# Patient Record
Sex: FEMALE | Race: BLACK OR AFRICAN AMERICAN | NOT HISPANIC OR LATINO | Employment: FULL TIME | URBAN - METROPOLITAN AREA
[De-identification: names, ages, dates, MRNs, and addresses within clinical notes are randomized per-mention and may not be internally consistent; named-entity substitution may affect disease eponyms.]

---

## 2016-07-21 LAB — PAP-ABSTRACT: NORMAL

## 2019-03-18 PROBLEM — D50.0 IRON DEFICIENCY ANEMIA DUE TO CHRONIC BLOOD LOSS: Status: ACTIVE | Noted: 2019-03-18

## 2019-03-19 ENCOUNTER — TELEPHONE (OUTPATIENT)
Dept: FAMILY MEDICINE | Facility: CLINIC | Age: 39
End: 2019-03-19

## 2019-03-19 ENCOUNTER — OFFICE VISIT (OUTPATIENT)
Dept: FAMILY MEDICINE | Facility: CLINIC | Age: 39
End: 2019-03-19
Payer: COMMERCIAL

## 2019-03-19 VITALS
DIASTOLIC BLOOD PRESSURE: 98 MMHG | HEART RATE: 112 BPM | TEMPERATURE: 98 F | SYSTOLIC BLOOD PRESSURE: 160 MMHG | BODY MASS INDEX: 33.59 KG/M2 | OXYGEN SATURATION: 100 % | WEIGHT: 214 LBS | HEIGHT: 67 IN

## 2019-03-19 DIAGNOSIS — Z00.00 ROUTINE HISTORY AND PHYSICAL EXAMINATION OF ADULT: Primary | ICD-10-CM

## 2019-03-19 DIAGNOSIS — N92.0 MENORRHAGIA WITH REGULAR CYCLE: ICD-10-CM

## 2019-03-19 DIAGNOSIS — D50.0 IRON DEFICIENCY ANEMIA DUE TO CHRONIC BLOOD LOSS: ICD-10-CM

## 2019-03-19 DIAGNOSIS — Z11.4 SCREENING FOR HUMAN IMMUNODEFICIENCY VIRUS WITHOUT PRESENCE OF RISK FACTORS: ICD-10-CM

## 2019-03-19 DIAGNOSIS — F41.9 ANXIETY: ICD-10-CM

## 2019-03-19 DIAGNOSIS — Z13.1 SCREENING FOR DIABETES MELLITUS: ICD-10-CM

## 2019-03-19 DIAGNOSIS — R03.0 ELEVATED BP WITHOUT DIAGNOSIS OF HYPERTENSION: ICD-10-CM

## 2019-03-19 DIAGNOSIS — Z13.21 ENCOUNTER FOR VITAMIN DEFICIENCY SCREENING: ICD-10-CM

## 2019-03-19 DIAGNOSIS — Z00.00 PREVENTATIVE HEALTH CARE: ICD-10-CM

## 2019-03-19 DIAGNOSIS — F10.20 ALCOHOL USE DISORDER, MODERATE, IN CONTROLLED ENVIRONMENT (H): ICD-10-CM

## 2019-03-19 DIAGNOSIS — J45.21 MILD INTERMITTENT ASTHMA WITH ACUTE EXACERBATION: ICD-10-CM

## 2019-03-19 DIAGNOSIS — Z12.4 SCREENING FOR MALIGNANT NEOPLASM OF CERVIX: ICD-10-CM

## 2019-03-19 DIAGNOSIS — R76.8 ANTI-TPO ANTIBODIES PRESENT: ICD-10-CM

## 2019-03-19 DIAGNOSIS — N39.3 FEMALE STRESS INCONTINENCE: ICD-10-CM

## 2019-03-19 DIAGNOSIS — Z13.220 LIPID SCREENING: ICD-10-CM

## 2019-03-19 LAB
ERYTHROCYTE [DISTWIDTH] IN BLOOD BY AUTOMATED COUNT: 14.7 % (ref 10–15)
HCT VFR BLD AUTO: 37 % (ref 35–47)
HGB BLD-MCNC: 12.7 G/DL (ref 11.7–15.7)
MCH RBC QN AUTO: 29.1 PG (ref 26.5–33)
MCHC RBC AUTO-ENTMCNC: 34.3 G/DL (ref 31.5–36.5)
MCV RBC AUTO: 85 FL (ref 78–100)
PLATELET # BLD AUTO: 221 10E9/L (ref 150–450)
RBC # BLD AUTO: 4.36 10E12/L (ref 3.8–5.2)
T3FREE SERPL-MCNC: 2.6 PG/ML (ref 2.3–4.2)
VIT B12 SERPL-MCNC: 1040 PG/ML (ref 193–986)
WBC # BLD AUTO: 3.3 10E9/L (ref 4–11)

## 2019-03-19 PROCEDURE — 82306 VITAMIN D 25 HYDROXY: CPT | Performed by: PHYSICIAN ASSISTANT

## 2019-03-19 PROCEDURE — 80053 COMPREHEN METABOLIC PANEL: CPT | Performed by: PHYSICIAN ASSISTANT

## 2019-03-19 PROCEDURE — 80061 LIPID PANEL: CPT | Performed by: PHYSICIAN ASSISTANT

## 2019-03-19 PROCEDURE — G0145 SCR C/V CYTO,THINLAYER,RESCR: HCPCS | Performed by: PHYSICIAN ASSISTANT

## 2019-03-19 PROCEDURE — 82728 ASSAY OF FERRITIN: CPT | Performed by: PHYSICIAN ASSISTANT

## 2019-03-19 PROCEDURE — 87624 HPV HI-RISK TYP POOLED RSLT: CPT | Performed by: PHYSICIAN ASSISTANT

## 2019-03-19 PROCEDURE — 99214 OFFICE O/P EST MOD 30 MIN: CPT | Mod: 25 | Performed by: PHYSICIAN ASSISTANT

## 2019-03-19 PROCEDURE — 82607 VITAMIN B-12: CPT | Performed by: PHYSICIAN ASSISTANT

## 2019-03-19 PROCEDURE — 82977 ASSAY OF GGT: CPT | Performed by: PHYSICIAN ASSISTANT

## 2019-03-19 PROCEDURE — 99385 PREV VISIT NEW AGE 18-39: CPT | Performed by: PHYSICIAN ASSISTANT

## 2019-03-19 PROCEDURE — 36415 COLL VENOUS BLD VENIPUNCTURE: CPT | Performed by: PHYSICIAN ASSISTANT

## 2019-03-19 PROCEDURE — 84439 ASSAY OF FREE THYROXINE: CPT | Performed by: PHYSICIAN ASSISTANT

## 2019-03-19 PROCEDURE — 84443 ASSAY THYROID STIM HORMONE: CPT | Performed by: PHYSICIAN ASSISTANT

## 2019-03-19 PROCEDURE — 84481 FREE ASSAY (FT-3): CPT | Performed by: PHYSICIAN ASSISTANT

## 2019-03-19 PROCEDURE — 87389 HIV-1 AG W/HIV-1&-2 AB AG IA: CPT | Performed by: PHYSICIAN ASSISTANT

## 2019-03-19 PROCEDURE — 85027 COMPLETE CBC AUTOMATED: CPT | Performed by: PHYSICIAN ASSISTANT

## 2019-03-19 RX ORDER — LANOLIN ALCOHOL/MO/W.PET/CERES
CREAM (GRAM) TOPICAL DAILY
COMMUNITY
Start: 2019-03-19

## 2019-03-19 RX ORDER — LEVALBUTEROL TARTRATE 45 UG/1
2 AEROSOL, METERED ORAL EVERY 4 HOURS PRN
Qty: 15 G | Refills: 1 | Status: SHIPPED | OUTPATIENT
Start: 2019-03-19 | End: 2020-04-03

## 2019-03-19 RX ORDER — FLUTICASONE PROPIONATE AND SALMETEROL XINAFOATE 115; 21 UG/1; UG/1
2 AEROSOL, METERED RESPIRATORY (INHALATION)
COMMUNITY
Start: 2018-11-15 | End: 2019-03-19

## 2019-03-19 RX ORDER — ASCORBIC ACID 500 MG
500 TABLET ORAL DAILY
COMMUNITY
Start: 2019-03-19 | End: 2020-03-25

## 2019-03-19 RX ORDER — FERROUS SULFATE 325(65) MG
325 TABLET, DELAYED RELEASE (ENTERIC COATED) ORAL EVERY 24 HOURS
COMMUNITY
Start: 2015-03-19

## 2019-03-19 RX ORDER — LEVALBUTEROL TARTRATE 45 UG/1
AEROSOL, METERED ORAL
COMMUNITY
Start: 2018-11-16 | End: 2019-03-19

## 2019-03-19 RX ORDER — FLUTICASONE PROPIONATE 50 MCG
2 SPRAY, SUSPENSION (ML) NASAL EVERY 24 HOURS
COMMUNITY
Start: 2016-04-15

## 2019-03-19 RX ORDER — LEVONORGESTREL/ETHIN.ESTRADIOL 0.1-0.02MG
1 TABLET ORAL DAILY
Qty: 84 TABLET | Refills: 3 | Status: CANCELLED | OUTPATIENT
Start: 2019-03-19

## 2019-03-19 RX ORDER — OMEGA-3 FATTY ACIDS/FISH OIL 300-1000MG
2 CAPSULE ORAL
COMMUNITY
End: 2024-08-23

## 2019-03-19 SDOH — HEALTH STABILITY: MENTAL HEALTH: HOW MANY STANDARD DRINKS CONTAINING ALCOHOL DO YOU HAVE ON A TYPICAL DAY?: 3 OR 4

## 2019-03-19 SDOH — HEALTH STABILITY: MENTAL HEALTH: HOW OFTEN DO YOU HAVE A DRINK CONTAINING ALCOHOL?: 4 OR MORE TIMES A WEEK

## 2019-03-19 SDOH — HEALTH STABILITY: MENTAL HEALTH: HOW OFTEN DO YOU HAVE 6 OR MORE DRINKS ON ONE OCCASION?: WEEKLY

## 2019-03-19 ASSESSMENT — MIFFLIN-ST. JEOR: SCORE: 1682.29

## 2019-03-19 NOTE — TELEPHONE ENCOUNTER
Attempt # [phone] to [phone]    Left non-detailed VM for patient to call back and speak with any triage nurse.    Carli Richmond, JERI, RN, PHN  [facility]

## 2019-03-19 NOTE — TELEPHONE ENCOUNTER
Reason for Call:  Other returning call    Detailed comments: The patient is returning a call left for her by the nurse. She would like a call back.    Phone Number Patient can be reached at: Cell number on file:    Telephone Information:   Mobile [phone]     Best Time: Anytime    Can we leave a detailed message on this number? YES    Call taken on 3/19/2019 at 3:28 PM by Cleo Mark

## 2019-03-19 NOTE — TELEPHONE ENCOUNTER
Please call pt to clarify what she has tried and what side effects (if any) so we can try to get a PA approved.  If no side effects/intolerances will have to send in albuterol.  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

## 2019-03-19 NOTE — TELEPHONE ENCOUNTER
Prior Authorization Retail Medication Request    Medication/Dose: Xopenex  ICD code (if different than what is on RX):  Mild intermittent asthma with acute exacerbation [J45.21]   Previously Tried and Failed:  unknown  Rationale:  unknown    Insurance Name:  [employer]  Insurance ID:  [id number]      -Cari Copeland,Certified Pharmacy Technician, CPhT,       [facility], Ph. [phone]
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
## 2019-03-19 NOTE — TELEPHONE ENCOUNTER
Patient said that she had heart palpitations on Albuterol that is why she was switched to Xopenex.      JERI Bonner, RN, [facility]) [phone]

## 2019-03-19 NOTE — LETTER
My Asthma Action Plan  Name: Alize Barrett   YOB: 1980  Date: 3/19/2019   My doctor: Lena Gaming PA-C   My clinic: [facility]   Good Control    I feel good    No cough or wheeze    Can work, sleep and play without asthma symptoms       Take your asthma control medicine every day.     1. If exercise triggers your asthma, take your rescue medication    15 minutes before exercise or sports, and    During exercise if you have asthma symptoms  2. Spacer to use with inhaler: If you have a spacer, make sure to use it with your inhaler             YELLOW ZONE Getting Worse  I have ANY of these:    I do not feel good    Cough or wheeze    Chest feels tight    Wake up at night   1. Keep taking your Green Zone medications  2. Start taking your rescue medicine:    every 20 minutes for up to 1 hour. Then every 4 hours for 24-48 hours.  3. If you stay in the Yellow Zone for more than 12-24 hours, contact your doctor.  4. If you do not return to the Green Zone in 12-24 hours or you get worse, start taking your oral steroid medicine if prescribed by your provider.           RED ZONE Medical Alert - Get Help  I have ANY of these:    I feel awful    Medicine is not helping    Breathing getting harder    Trouble walking or talking    Nose opens wide to breathe       1. Take your rescue medicine NOW  2. If your provider has prescribed an oral steroid medicine, start taking it NOW  3. Call your doctor NOW  4. If you are still in the Red Zone after 20 minutes and you have not reached your doctor:    Take your rescue medicine again and    Call 911 or go to the emergency room right away    See your regular doctor within 2 weeks of an Emergency Room or Urgent Care visit for follow-up treatment.          Annual Reminders:  Meet with Asthma Educator,  Flu Shot in the Fall, consider Pneumonia Vaccination for patients with asthma (aged 19 and older).    Pharmacy:  [address] [place] [address]                      Asthma Triggers  How To Control Things That Make Your Asthma Worse    Triggers are things that make your asthma worse.  Look at the list below to help you find your triggers and what you can do about them.  You can help prevent asthma flare-ups by staying away from your triggers.      Trigger                                                          What you can do   Cigarette Smoke  Tobacco smoke can make asthma worse. Do not allow smoking in your home, car or around you.  Be sure no one smokes at a child s day care or school.  If you smoke, ask your health care provider for ways to help you quit.  Ask family members to quit too.  Ask your health care provider for a referral to Quit Plan to help you quit smoking, or call 7-140-227-PLAN.     Colds, Flu, Bronchitis  These are common triggers of asthma. Wash your hands often.  Don t touch your eyes, nose or mouth.  Get a flu shot every year.     Dust Mites  These are tiny bugs that live in cloth or carpet. They are too small to see. Wash sheets and blankets in hot water every week.   Encase pillows and mattress in dust mite proof covers.  Avoid having carpet if you can. If you have carpet, vacuum weekly.   Use a dust mask and HEPA vacuum.   Pollen and Outdoor Mold  Some people are allergic to trees, grass, or weed pollen, or molds. Try to keep your windows closed.  Limit time out doors when pollen count is high.   Ask you health care provider about taking medicine during allergy season.     Animal Dander  Some people are allergic to skin flakes, urine or saliva from pets with fur or feathers. Keep pets with fur or feathers out of your home.    If you can t keep the pet outdoors, then keep the pet out of your bedroom.  Keep the bedroom door closed.  Keep pets off cloth furniture and away from stuffed toys.     Mice, Rats, and Cockroaches  Some people are allergic to the waste from these  pests.   Cover food and garbage.  Clean up spills and food crumbs.  Store grease in the refrigerator.   Keep food out of the bedroom.   Indoor Mold  This can be a trigger if your home has high moisture. Fix leaking faucets, pipes, or other sources of water.   Clean moldy surfaces.  Dehumidify basement if it is damp and smelly.   Smoke, Strong Odors, and Sprays  These can reduce air quality. Stay away from strong odors and sprays, such as perfume, powder, hair spray, paints, smoke incense, paint, cleaning products, candles and new carpet.   Exercise or Sports  Some people with asthma have this trigger. Be active!  Ask your doctor about taking medicine before sports or exercise to prevent symptoms.    Warm up for 5-10 minutes before and after sports or exercise.     Other Triggers of Asthma  Cold air:  Cover your nose and mouth with a scarf.  Sometimes laughing or crying can be a trigger.  Some medicines and food can trigger asthma.

## 2019-03-20 LAB
ALBUMIN SERPL-MCNC: 3.7 G/DL (ref 3.4–5)
ALP SERPL-CCNC: 37 U/L (ref 40–150)
ALT SERPL W P-5'-P-CCNC: 31 U/L (ref 0–50)
ANION GAP SERPL CALCULATED.3IONS-SCNC: 5 MMOL/L (ref 3–14)
AST SERPL W P-5'-P-CCNC: 22 U/L (ref 0–45)
BILIRUB SERPL-MCNC: 0.4 MG/DL (ref 0.2–1.3)
BUN SERPL-MCNC: 12 MG/DL (ref 7–30)
CALCIUM SERPL-MCNC: 8.6 MG/DL (ref 8.5–10.1)
CHLORIDE SERPL-SCNC: 109 MMOL/L (ref 94–109)
CHOLEST SERPL-MCNC: 167 MG/DL
CO2 SERPL-SCNC: 25 MMOL/L (ref 20–32)
CREAT SERPL-MCNC: 0.83 MG/DL (ref 0.52–1.04)
FERRITIN SERPL-MCNC: 35 NG/ML (ref 12–150)
GFR SERPL CREATININE-BSD FRML MDRD: 88 ML/MIN/{1.73_M2}
GGT SERPL-CCNC: 105 U/L (ref 0–40)
GLUCOSE SERPL-MCNC: 91 MG/DL (ref 70–99)
HDLC SERPL-MCNC: 60 MG/DL
LDLC SERPL CALC-MCNC: 72 MG/DL
NONHDLC SERPL-MCNC: 107 MG/DL
POTASSIUM SERPL-SCNC: 4.1 MMOL/L (ref 3.4–5.3)
PROT SERPL-MCNC: 7.5 G/DL (ref 6.8–8.8)
SODIUM SERPL-SCNC: 139 MMOL/L (ref 133–144)
T4 FREE SERPL-MCNC: 1.02 NG/DL (ref 0.76–1.46)
TRIGL SERPL-MCNC: 175 MG/DL
TSH SERPL DL<=0.005 MIU/L-ACNC: 1.98 MU/L (ref 0.4–4)

## 2019-03-20 ASSESSMENT — ASTHMA QUESTIONNAIRES: ACT_TOTALSCORE: 22

## 2019-03-20 NOTE — TELEPHONE ENCOUNTER
Prior Authorization Retail Medication Request    Medication/Dose: Xopenex  ICD code (if different than what is on RX):  Mild intermittent asthma with acute exacerbation [J45.21]   Previously Tried and Failed:  Palpitations with Albuterol   Rationale:  Side effects from out medication     Insurance Name:  [employer] [place]  Insurance ID:  [id number]      Pharmacy Information (if different than what is on RX)  Name:  [facility]   Phone:  [phone]    Sophie Aguirre MA

## 2019-03-21 LAB
COPATH REPORT: NORMAL
DEPRECATED CALCIDIOL+CALCIFEROL SERPL-MC: 49 UG/L (ref 20–75)
HIV 1+2 AB+HIV1 P24 AG SERPL QL IA: NONREACTIVE
PAP: NORMAL

## 2019-03-21 NOTE — RESULT ENCOUNTER NOTE
Alize  I have reviewed your recent labs. Here are the results:    -Normal red blood cell (hgb) levels, low-normal white blood cell count and normal platelet levels.  We don't have a comparison on file so we will recheck your white count at your follow up appointment.  -Triglycerides are elevated which can increase your heart disease risk, this is MOST likely from your alcohol intake.  A diet high in fat and simple carbohydrates, genetics and being overweight can contribute to this.  Your LDL(bad) cholesterol and HDL(good) cholesterol levels are normal.  ADVISE: exercising 150 minutes of aerobic exercise per week (30 minutes 5 days per week or 50 minutes 3 days per week are options), weight control, and omega-3 fatty acids (fish oil) 2146-9194 mg daily are helpful to improve this.  In 3 months, you should recheck your fasting cholesterol panel  -Liver and gallbladder tests are normal (ALT,AST, Alk phos, bilirubin), kidney function is normal (Cr, GFR), sodium is normal, potassium is normal, calcium is normal, glucose is normal.  -TSH (thyroid stimulating hormone) level, T4 and T3 (thyroid hormones) are normal which indicates normal thyroid function.  -HIV test is normal.  -Vitamin D level is normal and getting 1000 IU daily in your diet or supplements is recommended.   -Ferritin (iron) level is normal.  -GGT level is high which is a liver function test that is VERY sensitive to alcohol.  Continue to work on cessation/tapering and we will follow this up and make sure it is trending down.  For additional lab test information, labtestsonline.org is an excellent reference.     If you have any questions please do not hesitate to contact our office via phone ([phone]) or MyChart.    Lena Gaming, MS, PA-C  [facility]

## 2019-03-25 LAB
FINAL DIAGNOSIS: NORMAL
HPV HR 12 DNA CVX QL NAA+PROBE: NEGATIVE
HPV16 DNA SPEC QL NAA+PROBE: NEGATIVE
HPV18 DNA SPEC QL NAA+PROBE: NEGATIVE
SPECIMEN DESCRIPTION: NORMAL
SPECIMEN SOURCE CVX/VAG CYTO: NORMAL

## 2019-03-25 NOTE — TELEPHONE ENCOUNTER
Prior Authorization Approval    Authorization Effective Date: 3/25/2019  Authorization Expiration Date: 3/24/2020  Medication: Xopenex- APPROVED  Approved Dose/Quantity: 15G PER 17 DAYS  Reference #: [id number]   Insurance Company: [employer] - Phone [phone] Fax [phone]  Expected CoPay:       CoPay Card Available:      Foundation Assistance Needed:    Which Pharmacy is filling the prescription (Not needed for infusion/clinic administered): [facility] - [place], [place] - [address] [place] [address]  Pharmacy Notified: Yes  Patient Notified: Yes

## 2019-03-25 NOTE — TELEPHONE ENCOUNTER
PA Initiation    Medication: Xopenex- INITIATED  Insurance Company: [employer] - Phone [phone] Fax [phone]  Pharmacy Filling the Rx: [facility] - [place], [place] - [address] [place] [address]  Filling Pharmacy Phone: [phone]  Filling Pharmacy Fax:    Start Date: 3/25/2019

## 2019-04-30 ENCOUNTER — ALLIED HEALTH/NURSE VISIT (OUTPATIENT)
Dept: NURSING | Facility: CLINIC | Age: 39
End: 2019-04-30
Payer: COMMERCIAL

## 2019-04-30 DIAGNOSIS — Z23 ENCOUNTER FOR IMMUNIZATION: Primary | ICD-10-CM

## 2019-04-30 PROCEDURE — 90471 IMMUNIZATION ADMIN: CPT

## 2019-04-30 PROCEDURE — 90707 MMR VACCINE SC: CPT

## 2019-06-11 ENCOUNTER — TELEPHONE (OUTPATIENT)
Dept: ADDICTION MEDICINE | Facility: CLINIC | Age: 39
End: 2019-06-11

## 2019-06-11 ENCOUNTER — OFFICE VISIT (OUTPATIENT)
Dept: FAMILY MEDICINE | Facility: CLINIC | Age: 39
End: 2019-06-11
Payer: COMMERCIAL

## 2019-06-11 VITALS
SYSTOLIC BLOOD PRESSURE: 142 MMHG | OXYGEN SATURATION: 100 % | BODY MASS INDEX: 33.27 KG/M2 | DIASTOLIC BLOOD PRESSURE: 94 MMHG | TEMPERATURE: 98.2 F | WEIGHT: 214 LBS | HEART RATE: 97 BPM

## 2019-06-11 DIAGNOSIS — I10 ESSENTIAL HYPERTENSION: ICD-10-CM

## 2019-06-11 DIAGNOSIS — F10.20 ALCOHOL USE DISORDER, MODERATE, IN CONTROLLED ENVIRONMENT (H): Primary | ICD-10-CM

## 2019-06-11 PROCEDURE — 99214 OFFICE O/P EST MOD 30 MIN: CPT | Performed by: FAMILY MEDICINE

## 2019-06-11 RX ORDER — HYDROCHLOROTHIAZIDE 12.5 MG/1
12.5 TABLET ORAL DAILY
Qty: 30 TABLET | Refills: 0 | Status: SHIPPED | OUTPATIENT
Start: 2019-06-11 | End: 2019-07-10

## 2019-06-11 NOTE — TELEPHONE ENCOUNTER
Please review referral. Please route back to reception pool #[id number].    Thank you,    Arline Faria  [facility]

## 2019-06-11 NOTE — PROGRESS NOTES
Subjective     Alize Barrett is a 38 year old female who presents to clinic today for the following health issues:    HPI   Hypertension Follow-up  High blood pressures last 2 weeks    Do you check your blood pressure regularly outside of the clinic? Yes     Are you following a low salt diet? No    Are your blood pressures ever more than 140 on the top number (systolic) OR more   than 90 on the bottom number (diastolic), for example 140/90? Yes 168/104 is the highest  Amount of exercise or physical activity: None    Problems taking medications regularly: n/a    Medication side effects: n/a    Diet: regular (no restrictions)    Denies any headaches, lightheadedness, dizziness, vision changes, chest pain, palpitations, shortness of breath, dyspnea, numbness/tingling.      Alcohol Use Disorder: Alize is currently drinking 12 oz of hard alcohol daily. She attributes much of her drinking to the stress of her job. She works as a respiratory therapist in pediatrics.    Patient Active Problem List   Diagnosis     Anti-TPO antibodies present     Anxiety     Class 2 obesity without serious comorbidity with body mass index (BMI) of 35.0 to 35.9 in adult     Iron deficiency anemia due to chronic blood loss     Mild intermittent asthma with acute exacerbation     PMS (premenstrual syndrome)     Alcohol use disorder, moderate, in controlled environment (H)     Menorrhagia with regular cycle     Encounter for vitamin deficiency screening     Past Surgical History:   Procedure Laterality Date     LAPAROSCOPIC TUBAL LIGATION Bilateral        Social History     Tobacco Use     Smoking status: Former Smoker     Packs/day: 0.50     Years: 17.00     Pack years: 8.50     Types: Cigarettes     Last attempt to quit: 3/19/2012     Years since quittin.2     Smokeless tobacco: Never Used   Substance Use Topics     Alcohol use: Yes     Alcohol/week: 21.6 oz     Types: 36 Standard drinks or equivalent per week     Frequency: 4 or  more times a week     Drinks per session: 3 or 4     Binge frequency: Weekly     Family History   Problem Relation Age of Onset     Hypertension Mother      Kidney failure Father 32     Coronary Artery Disease Maternal Grandmother      Diabetes Maternal Grandmother      Cancer Maternal Grandmother         possibly gynecologist but unsure     Alcoholism Maternal Grandfather      Kidney Disease Paternal Grandmother      Colon Cancer No family hx of      Breast Cancer No family hx of              Reviewed and updated as needed this visit by Provider         Review of Systems   ROS COMP: Constitutional, HEENT, cardiovascular, pulmonary, gi and gu systems are negative, except as otherwise noted.      Objective    BP (!) 142/94   Pulse 97   Temp 98.2  F (36.8  C) (Oral)   Wt 97.1 kg (214 lb)   SpO2 100%   BMI 33.27 kg/m    Body mass index is 33.27 kg/m .  Physical Exam   GENERAL: healthy, alert and no distress  RESP: lungs clear to auscultation - no rales, rhonchi or wheezes  CV: regular rate and rhythm, normal S1 S2, no S3 or S4, no murmur, click or rub, no peripheral edema and peripheral pulses strong  MS: no gross musculoskeletal defects noted, no edema  PSYCH: mentation appears normal, affect normal/bright    Diagnostic Test Results:  none         Assessment & Plan     1. Alcohol use disorder, moderate, in controlled environment (H): referral placed to addiction clinic.  - ADDICTION MEDICINE REFERRAL    2. Essential hypertension: start hydrochlorothiazide 12.5 mg daily. Discussed increasing exercise, weight loss, low sodium diet, and cutting back on alcohol to help with blood pressure. Follow up in 2 weeks for BP recheck - titrate hydrochlorothiazide as needed.  - hydrochlorothiazide (HYDRODIURIL) 12.5 MG tablet; Take 1 tablet (12.5 mg) by mouth daily  Dispense: 30 tablet; Refill: 0  - RN HTN MGMT     BMI:   Estimated body mass index is 33.27 kg/m  as calculated from the following:    Height as of 3/19/19: 1.708  "m (5' 7.25\").    Weight as of this encounter: 97.1 kg (214 lb).   Weight management plan: Discussed healthy diet and exercise guidelines        Return in about 2 weeks (around 6/25/2019) for BP Recheck with RN.    Jasper Lomax, [facility]      "

## 2019-06-13 NOTE — TELEPHONE ENCOUNTER
Pt is scheduled to see Dr. Feliz on 6/28 @ 10:40 am.  Closing encounter, no further action needed.      Zan Crowe

## 2019-06-14 ENCOUNTER — TELEPHONE (OUTPATIENT)
Dept: FAMILY MEDICINE | Facility: CLINIC | Age: 39
End: 2019-06-14

## 2019-06-14 NOTE — TELEPHONE ENCOUNTER
I spoke to Nestor. Advised I don't have consent to communicate. Nestor says he understands and gave me her call number to call.     Cell is [phone]. I left non detailed message advising to call me back as I would like to discuss her concerns. Deanna Ocampo R.N.

## 2019-06-14 NOTE — LETTER
[facility]  [address] [facility] [address] [phone]  June 18, 2019    Alize Barrett  [address] [place] [id number]    Dear Alize,    Our office has been trying to reach you in regards to a question you had, but we have been unsuccessful in our attempts to reach you.  At this time, Dr. Lomax would like to see you in the office or do a phone appointment to address the symptoms you are having.  Please contact our office at your earliest convenience at [phone].    Thank you for choosing [facility]

## 2019-06-14 NOTE — TELEPHONE ENCOUNTER
Reason for Call:  Other prescription    Detailed comments: Since beginning the hydrochlorothiazide she has begun retaining fluid.  would like to know if she get also get a lasix prescribed to help with that.      Phone Number Patient can be reached at: Home number on file [phone] (home)    Best Time: anytime     Can we leave a detailed message on this number? YES    Call taken on 6/14/2019 at 1:54 PM by Yolanda Aguillon

## 2019-06-14 NOTE — TELEPHONE ENCOUNTER
[facility]. Left message to return call and I will try back later as well. Deanna Ocampo R.N.

## 2019-06-14 NOTE — TELEPHONE ENCOUNTER
"I returned call for 3rd time this afternoon and it went to voice mail. I left detailed message and informed Rosalie that the hydrochlorothiazide is a diuretic so it should be helping eliminate excess fluid. Informed her I wanted to gather more information on where her swelling is, does she have any SOB, other signs or symptoms and other triage questions. Since I am unable to assess or reach her despite calling back 3 times, I did advise if she has any color change of skin, warmth, or coolness to the touch, and SOB, numbness or tingling of extremities of new \"swelling\" where it was present or any other new or concerning symptoms she should report to ED. Advised can try calling back and discussing with FNA RN to help further triage and assess.     Will route to Dr. Lomax to advise. Deanna Ocampo R.N.    "

## 2019-06-14 NOTE — TELEPHONE ENCOUNTER
Reason for Call:  Other returning call    Detailed comments: Pt called this afternoon and is returning a nurse phone call from Dr. Lomax. Please give pt a call back when you can. Thank you.    Phone Number Patient can be reached at: Home number on file [phone] (home)    Best Time:     Can we leave a detailed message on this number? YES    Call taken on 6/14/2019 at 3:12 PM by Martina Garcia

## 2019-06-15 NOTE — TELEPHONE ENCOUNTER
I would not expect any lower extremity swelling to be caused by hydrochlorothiazide. We can most certainly try an alternative antihypertensive medication, however, I would first like to get more information about her symptoms. We could set up an eVisit or telephone visit if that is convenient for Alize.    Jasper Lomax DO  6/15/2019 8:41 AM

## 2019-06-17 NOTE — TELEPHONE ENCOUNTER
Call 4th attempt since Friday to reach Alize, no answer. Left a message I will have out  reach out to her to see if she would like to schedule a telephone or other visit to discuss concerns directly with Dr. Lomax. Deanna Ocampo R.N.

## 2019-06-28 ENCOUNTER — OFFICE VISIT (OUTPATIENT)
Dept: ADDICTION MEDICINE | Facility: CLINIC | Age: 39
End: 2019-06-28
Payer: COMMERCIAL

## 2019-06-28 VITALS
HEART RATE: 94 BPM | DIASTOLIC BLOOD PRESSURE: 60 MMHG | OXYGEN SATURATION: 100 % | TEMPERATURE: 99 F | BODY MASS INDEX: 33.43 KG/M2 | WEIGHT: 213 LBS | SYSTOLIC BLOOD PRESSURE: 110 MMHG | HEIGHT: 67 IN

## 2019-06-28 DIAGNOSIS — F41.9 ANXIETY: ICD-10-CM

## 2019-06-28 DIAGNOSIS — F10.20 ALCOHOL USE DISORDER, MODERATE, IN CONTROLLED ENVIRONMENT (H): Primary | ICD-10-CM

## 2019-06-28 DIAGNOSIS — Z79.899 HIGH RISK MEDICATION USE: ICD-10-CM

## 2019-06-28 LAB
AMPHETAMINES UR QL: NOT DETECTED NG/ML
BARBITURATES UR QL SCN: NOT DETECTED NG/ML
BENZODIAZ UR QL SCN: NOT DETECTED NG/ML
BUPRENORPHINE UR QL: NOT DETECTED NG/ML
CANNABINOIDS UR QL: NOT DETECTED NG/ML
COCAINE UR QL SCN: NOT DETECTED NG/ML
D-METHAMPHET UR QL: NOT DETECTED NG/ML
METHADONE UR QL SCN: NOT DETECTED NG/ML
OPIATES UR QL SCN: NOT DETECTED NG/ML
OXYCODONE UR QL SCN: NOT DETECTED NG/ML
PCP UR QL SCN: NOT DETECTED NG/ML
PROPOXYPH UR QL: NOT DETECTED NG/ML
TRICYCLICS UR QL SCN: NOT DETECTED NG/ML

## 2019-06-28 PROCEDURE — 80306 DRUG TEST PRSMV INSTRMNT: CPT | Performed by: PEDIATRICS

## 2019-06-28 PROCEDURE — 99204 OFFICE O/P NEW MOD 45 MIN: CPT | Performed by: PEDIATRICS

## 2019-06-28 RX ORDER — DIAZEPAM 5 MG
5 TABLET ORAL EVERY 8 HOURS PRN
Qty: 12 TABLET | Refills: 0 | Status: SHIPPED | OUTPATIENT
Start: 2019-06-28 | End: 2019-09-27

## 2019-06-28 RX ORDER — GABAPENTIN 300 MG/1
300 CAPSULE ORAL 3 TIMES DAILY
Qty: 30 CAPSULE | Refills: 0 | Status: SHIPPED | OUTPATIENT
Start: 2019-06-28 | End: 2020-01-30

## 2019-06-28 RX ORDER — NALTREXONE HYDROCHLORIDE 50 MG/1
50 TABLET, FILM COATED ORAL DAILY
Qty: 30 TABLET | Refills: 0 | Status: SHIPPED | OUTPATIENT
Start: 2019-06-28 | End: 2020-03-25 | Stop reason: SINTOL

## 2019-06-28 ASSESSMENT — MIFFLIN-ST. JEOR: SCORE: 1677.75

## 2019-06-28 NOTE — PATIENT INSTRUCTIONS
Gabapentin 300mg 3 x day starting now as needed for anxiety.     Naltrexone 25mg daily for 3 days then 50mg daily    Follow up 11:40  Alley July 3 th.  Dr. Feliz    AA meeting.     Counseled the patient on the importance of having a recovery program in addition to Medication assisted treatment.    Components of a recovery program include having some type of sober network, avoiding isolating, having willingness  to change, avoiding triggers and managing cravings.    Options for support include :  Alcoholics Anonymous   (www.aa.org)       AA Intergroup service office in [place]  (http://www.aastpaul.org/)   [phone]  ([place] [phone])  AA Intergroup service office in [facility]: (http://www.[employer].org/)  [phone].   Narcotics Anonymous (www.[place].org)    [phone]   [employer]   (www.[employer])  [facility] ([facility])  [facility] connects people seeking recovery to resources that help foster and sustain long-term recovery.  Whether you are seeking resources for treatment, transportation, housing, job training, education, health care or other pathways to recovery, [facility] is a great place to start.  (www.[employer].org)   [phone].     [place] and local community support groups, mental health counseling.       Strongly recommended abstaining from alcohol, benzodiazepines, THC, opioids and other drugs of abuse.

## 2019-06-28 NOTE — PROGRESS NOTES
SUBJECTIVE:                                                        Alize Barrett is a 38 year old female who presents for  initial visit for addiction consultation and management referred by Dr. Lomax.        [place] Board of Pharmacy Data Base Reviewed:    Yes; No Concerns Noted   6/28/2019      HPI:  Patient is concerned about her inability to quit drinking.    About two years ago in college was drinking for stress relief.  Has escalated to point that she is having severe anxiety if she tries to take  a few days without drinking.  More recently she starts every day promising herself not to drink but isn't able to abstain.    Currently average drinking 1.75 L q 3-4 days.  Drinking in evening.  On day off will start earlier.    Has been trying to cut back  for about four months.    This morning was experiencing significant anxiety.  Occasionally has hand tremor.  Denies vomiting.  Mild headache.  No hallucinations.  No history of seizures.  No history of complicated withdrawal.    Came in to see PCP. Due to BP increasing has been up to 160/99.     THC occasionally vape very rare.    Has been  prescribed  Xanax  (took 1/2 tab on way here).  Only filled once about 5 yr ago. Still has more than half rx left.       NICOTINE-previous               PAST PSYCHIATRIC HISTORY   long-standing history of anxiety and panic disorder.  Was treated for period of time with very low-dose Prozac but was never very comfortable taking it.  Feels that she is self-medicating with her drinking.    Patient Active Problem List    Diagnosis Date Noted     Alcohol use disorder, moderate, in controlled environment (H) 03/19/2019     Priority: Medium     Menorrhagia with regular cycle 03/19/2019     Priority: Medium     Encounter for vitamin deficiency screening 03/19/2019     Priority: Medium     Iron deficiency anemia due to chronic blood loss 03/18/2019     Priority: Medium     Anti-TPO antibodies present 11/17/2014      Priority: Medium     Overview:   Has normal TSH.  Recommend annual TSH cascade.       Class 2 obesity without serious comorbidity with body mass index (BMI) of 35.0 to 35.9 in adult 11/06/2014     Priority: Medium     Overview:   BMI 35       Mild intermittent asthma with acute exacerbation 08/07/2014     Priority: Medium     Anxiety 01/19/2013     Priority: Medium     PMS (premenstrual syndrome) 08/04/2012     Priority: Medium       Problem list and histories reviewed & adjusted, as indicated.  Additional history: as documented     Past Medical History:   Diagnosis Date     Allergy, unspecified not elsewhere classified     seasonal     Asthma 2012       Past Surgical History:   Procedure Laterality Date     LAPAROSCOPIC TUBAL LIGATION Bilateral 2003         Family History   Problem Relation Age of Onset     Hypertension Mother      Kidney failure Father 32     Coronary Artery Disease Maternal Grandmother      Diabetes Maternal Grandmother      Cancer Maternal Grandmother         possibly gynecologist but unsure     Alcoholism Maternal Grandfather      Kidney Disease Paternal Grandmother      Colon Cancer No family hx of      Breast Cancer No family hx of          Social History     Social History Narrative        Currently working as a Pediatric Respiratory Therapist.       Will be switching night in August.  Currently training days 12 hr shift.      Lives with  and 15 yr old daughter and 18yr old son.   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     He drinks more than her.  He doesn't think it is an issue.  She is not sure if he will support her in not drinking but thinks he might.           Current Outpatient Medications   Medication Sig Dispense Refill     ADVIL 200 MG OR TABS 1 TABLET EVERY 4 TO 6 HOURS AS NEEDED       cyanocobalamin (VITAMIN B-12) 1000 MCG tablet Take by mouth daily       EXCEDRIN EXTRA STRENGTH OR 2 TABLETS EVERY 6 HOURS AS NEEDED       ferrous sulfate (FE TABS) 325 (65 Fe) MG EC tablet Take 325  "mg by mouth every 24 hours       fluticasone (FLONASE) 50 MCG/ACT nasal spray Spray 2 sprays in nostril every 24 hours       fluticasone-vilanterol (BREO ELLIPTA) 200-25 MCG/INH inhaler Inhale 1 puff into the lungs daily 1 Inhaler 5     hydrochlorothiazide (HYDRODIURIL) 12.5 MG tablet Take 1 tablet (12.5 mg) by mouth daily 30 tablet 0     levalbuterol (XOPENEX HFA) 45 MCG/ACT inhaler Inhale 2 puffs into the lungs every 4 hours as needed for shortness of breath / dyspnea or wheezing 15 g 1     Multiple Vitamins-Minerals (MULTIVITAMIN ADULT PO)        omega 3 1000 MG CAPS Take 2 g by mouth       vitamin C (ASCORBIC ACID) 500 MG tablet Take 1 tablet (500 mg) by mouth daily       vitamin D3 (CHOLECALCIFEROL) 1000 units (25 mcg) tablet            No Known Allergies        REVIEW OF SYSTEMS:    General: see above regarding  acute withdrawal symptoms.  No recent infections or fever  Eyes:  No vision concerns.  No double vision.    Resp: No coughing, wheezing or shortness of breath  CV: No chest pains or palpitations  GI: No nausea, vomiting, abdominal pain, diarrhea.  No constipation  : No urinary frequency or dysuria    Musculoskeletal: No significant muscle or joint pains other than as above.  No edema  Neurologic: No numbness, tingling, weakness, problems with balance or coordination  Psychiatric: No acute concerns other than as above.   Skin: No rashes or areas of acute infection        OBJECTIVE:                                                        EXAM    Blood pressure 110/60, pulse 94, temperature 99  F (37.2  C), temperature source Oral, height 1.708 m (5' 7.25\"), weight 96.6 kg (213 lb), SpO2 100 %.    GENERAL APPEARANCE: Anxious appearing  EYES: Eyes grossly normal to inspection  HENT: TM's normal, mouth without ulcers or lesions, nose no rhinorrhea  NECK: no adenopathy, thyromegaly or masses  RESP: lungs clear to auscultation - no rales, rhonchi or wheezes and no resp distress  CV: regular rates and rhythm, " normal S1 S2,no murmur   ABDOMEN: soft, nontender, without hepatosplenomegaly or masses  MS: extremities normal- no gross deformities noted, gait normal, peripheral pulses normal and no edema  SKIN: no rashes, no jaundice, no obvious masses.   NEURO: Normal strength and tone, sensory exam grossly normal, no tremor  MENTAL STATUS EXAM:  Appearance/Behavior:No appearant distress and Neatly groomed  Speech: Normal  Mood/Affect: anxiety  Insight: Fair        Results for orders placed or performed in visit on 06/28/19   Urine Drugs of Abuse Screen Panel 13   Result Value Ref Range    Cannabinoids (12-dkz-9-carboxy-9-THC) Not Detected NDET^Not Detected ng/mL    Phencyclidine (Phencyclidine) Not Detected NDET^Not Detected ng/mL    Cocaine (Benzoylecgonine) Not Detected NDET^Not Detected ng/mL    Methamphetamine (d-Methamphetamine) Not Detected NDET^Not Detected ng/mL    Opiates (Morphine) Not Detected NDET^Not Detected ng/mL    Amphetamine (d-Amphetamine) Not Detected NDET^Not Detected ng/mL    Benzodiazepines (Nordiazepam) Not Detected NDET^Not Detected ng/mL    Tricyclic Antidepressants (Desipramine) Not Detected NDET^Not Detected ng/mL    Methadone (Methadone) Not Detected NDET^Not Detected ng/mL    Barbiturates (Butalbital) Not Detected NDET^Not Detected ng/mL    Oxycodone (Oxycodone) Not Detected NDET^Not Detected ng/mL    Propoxyphene (Norpropoxyphene) Not Detected NDET^Not Detected ng/mL    Buprenorphine (Buprenorphine) Not Detected NDET^Not Detected ng/mL         ASSESSMENT/PLAN:    (F10.20) Alcohol use disorder, moderate, in controlled environment (H)  (primary encounter diagnosis)  Comment:  Plan: Urine Drugs of Abuse Screen Panel 13,         gabapentin (NEURONTIN) 300 MG capsule, diazepam        (VALIUM) 5 MG tablet, naltrexone (DEPADE/REVIA)        50 MG tablet        Reviewed the following with regard to definition of addiction:  You may be addicted to alcohol if your drinking harms yourself or others or leads  to other problems with your daily life.  The medical term for this is Alcohol use disorder. Your healthcare provider may diagnose you with this disorder if you have at least two of the following problems within the span of a year:    You drink alcohol in larger amounts or for a longer period than you intended.    You frequently want to cut down or control alcohol use, or have frequently failed efforts to do so.    You spend a lot of time getting alcohol, using alcohol, or recovering from alcohol use.    You crave or have a strong desire or urge to drink alcohol.    Ongoing alcohol use makes it hard for you to be responsible at work, school, or home.    You continue to use alcohol even though you have had problems in relationships or social settings because of it.    You give up or miss important social, work, or recreational activities because of alcohol use.    You drink alcohol in situations in which it is physically unsafe, such as drinking then driving while intoxicated.    You continue to drink alcohol despite knowing it has caused physical or emotional problems.    You need more and more alcohol to get the same effects.    You hide how much alcohol you drink from family and friends.    You have withdrawal symptoms or use alcohol to avoid withdrawal symptoms.    Risks of alcohol withdrawal including risk of seizure, DT, Death reviewed.    Warning symptoms discussed and encouraged presentation to ED with any symptoms.    Possible need for inpatient detoxification medically reviewed.  Patient would like to trial outpatient detoxification starting over the next holiday weekend.  She would like to try to cut back in the meantime.      Given contact information for [facility] - To check the status of detox bed availability at [facility] call:  [phone] (call at 0800 prior to going to the ER).  Indications for hospitalization discussed.       Medications for alcohol dependence reviewed.    Disulfiram  (Antabuse), Acamprosate(Campral)  and Naltrexone (Revia/Vivitrol)  all reviewed.    Risk, benefit, side effects and intended purposes of each reviewed.     Effect of Naltrexont/Vivitrol with opioid use reviewed.     Patient is interested in Naltrexone.           Gabapentin 300mg 3 x day starting now as needed for anxiety.     Naltrexone 25mg daily for 3 days then 50mg daily    Follow up 11:40  Alley July 3 th.  Dr. Feliz    AA meetings discussed.  Patient plans to pursue and meeting finder reviewed.    At Follow up with discuss further outpatient detox with Valium and gabapentin as discussed at length today when she will be off work for several days.     Work implications of alcohol use reviewed.  Will continue to discuss.  Support options reviewed.     CD and take information given.  Patient encouraged to consider at least performing a CD evaluation.  Variety of treatment options available discussed at length.    (F41.9) Anxiety  Plan: Patient encouraged to follow-up with PCP and consider collaborative care psychiatry referral for long-standing history of anxiety and suspected panic disorder.  Reviewed medication may be recommended moving forward in combination with above recommendations.  Mental health counseling strongly recommended.  Referral options discussed.  Would avoid use of benzodiazepines other than prescribed as above.  Risk of use of benzodiazepines with alcohol discussed at length.    (Z79.899) High risk medication use  Plan:         ENCOUNTER FOR LONG TERM USE OF HIGH RISK MEDICATION   High Risk Drug Monitoring?  YES   Drug being monitored: naltrexone, Gabapentin, Valium (planned)    Reason for drug: alcohol Use Disorder   What is being monitored?: Dosage, Cravings, Triggers and side effects         Veronika Feliz MD  [facility]  [phone]

## 2019-07-10 DIAGNOSIS — I10 ESSENTIAL HYPERTENSION: ICD-10-CM

## 2019-07-10 RX ORDER — HYDROCHLOROTHIAZIDE 12.5 MG/1
12.5 TABLET ORAL DAILY
Qty: 90 TABLET | Refills: 0 | Status: SHIPPED | OUTPATIENT
Start: 2019-07-10 | End: 2019-12-05

## 2019-09-27 ENCOUNTER — OFFICE VISIT (OUTPATIENT)
Dept: FAMILY MEDICINE | Facility: CLINIC | Age: 39
End: 2019-09-27
Payer: COMMERCIAL

## 2019-09-27 VITALS
OXYGEN SATURATION: 98 % | HEART RATE: 84 BPM | SYSTOLIC BLOOD PRESSURE: 118 MMHG | WEIGHT: 218 LBS | BODY MASS INDEX: 33.89 KG/M2 | TEMPERATURE: 98.6 F | DIASTOLIC BLOOD PRESSURE: 80 MMHG

## 2019-09-27 DIAGNOSIS — Z86.39 HISTORY OF HYPOTHYROIDISM: ICD-10-CM

## 2019-09-27 DIAGNOSIS — D50.0 IRON DEFICIENCY ANEMIA DUE TO CHRONIC BLOOD LOSS: ICD-10-CM

## 2019-09-27 DIAGNOSIS — Z01.818 PREOP GENERAL PHYSICAL EXAM: Primary | ICD-10-CM

## 2019-09-27 DIAGNOSIS — H33.21 RIGHT RETINAL DETACHMENT: ICD-10-CM

## 2019-09-27 DIAGNOSIS — I10 ESSENTIAL HYPERTENSION: ICD-10-CM

## 2019-09-27 LAB
ANION GAP SERPL CALCULATED.3IONS-SCNC: 9 MMOL/L (ref 3–14)
BASOPHILS # BLD AUTO: 0 10E9/L (ref 0–0.2)
BASOPHILS NFR BLD AUTO: 0.2 %
BUN SERPL-MCNC: 14 MG/DL (ref 7–30)
CALCIUM SERPL-MCNC: 8.9 MG/DL (ref 8.5–10.1)
CHLORIDE SERPL-SCNC: 102 MMOL/L (ref 94–109)
CO2 SERPL-SCNC: 23 MMOL/L (ref 20–32)
CREAT SERPL-MCNC: 0.77 MG/DL (ref 0.52–1.04)
DIFFERENTIAL METHOD BLD: ABNORMAL
EOSINOPHIL # BLD AUTO: 0.1 10E9/L (ref 0–0.7)
EOSINOPHIL NFR BLD AUTO: 2.1 %
ERYTHROCYTE [DISTWIDTH] IN BLOOD BY AUTOMATED COUNT: 15.8 % (ref 10–15)
GFR SERPL CREATININE-BSD FRML MDRD: >90 ML/MIN/{1.73_M2}
GLUCOSE SERPL-MCNC: 96 MG/DL (ref 70–99)
HCT VFR BLD AUTO: 37.1 % (ref 35–47)
HGB BLD-MCNC: 12.2 G/DL (ref 11.7–15.7)
LYMPHOCYTES # BLD AUTO: 1.8 10E9/L (ref 0.8–5.3)
LYMPHOCYTES NFR BLD AUTO: 35.2 %
MCH RBC QN AUTO: 27.3 PG (ref 26.5–33)
MCHC RBC AUTO-ENTMCNC: 32.9 G/DL (ref 31.5–36.5)
MCV RBC AUTO: 83 FL (ref 78–100)
MONOCYTES # BLD AUTO: 0.5 10E9/L (ref 0–1.3)
MONOCYTES NFR BLD AUTO: 10.5 %
NEUTROPHILS # BLD AUTO: 2.7 10E9/L (ref 1.6–8.3)
NEUTROPHILS NFR BLD AUTO: 52 %
PLATELET # BLD AUTO: 237 10E9/L (ref 150–450)
POTASSIUM SERPL-SCNC: 3.5 MMOL/L (ref 3.4–5.3)
RBC # BLD AUTO: 4.47 10E12/L (ref 3.8–5.2)
SODIUM SERPL-SCNC: 134 MMOL/L (ref 133–144)
TSH SERPL DL<=0.005 MIU/L-ACNC: 3.76 MU/L (ref 0.4–4)
WBC # BLD AUTO: 5.1 10E9/L (ref 4–11)

## 2019-09-27 PROCEDURE — 36415 COLL VENOUS BLD VENIPUNCTURE: CPT | Performed by: NURSE PRACTITIONER

## 2019-09-27 PROCEDURE — 85025 COMPLETE CBC W/AUTO DIFF WBC: CPT | Performed by: NURSE PRACTITIONER

## 2019-09-27 PROCEDURE — 84443 ASSAY THYROID STIM HORMONE: CPT | Performed by: NURSE PRACTITIONER

## 2019-09-27 PROCEDURE — 99214 OFFICE O/P EST MOD 30 MIN: CPT | Performed by: NURSE PRACTITIONER

## 2019-09-27 PROCEDURE — 80048 BASIC METABOLIC PNL TOTAL CA: CPT | Performed by: NURSE PRACTITIONER

## 2019-09-27 RX ORDER — DIAZEPAM 5 MG
5 TABLET ORAL EVERY 8 HOURS PRN
Qty: 4 TABLET | Refills: 0 | Status: SHIPPED | OUTPATIENT
Start: 2019-09-27 | End: 2020-01-30

## 2019-09-27 NOTE — PROGRESS NOTES
[facility]  [address] [address]  [phone]  Dept: [phone]    PRE-OP EVALUATION:  Today's date: 2019    Alize Barrett (: 1980) presents for pre-operative evaluation assessment as requested by Dr. Tobar.  She requires evaluation and anesthesia risk assessment prior to undergoing surgery/procedure for treatment of Right retina re-attachment .    Proposed Surgery/ Procedure: Retina attachment   Date of Surgery/ Procedure: 19  Time of Surgery/ Procedure: 7:00 am   Hospital/Surgical Facility: [facility] [place]   Fax number for surgical facility: [phone]  Primary Physician: Clinic, [place]  Type of Anesthesia Anticipated: to be determined    Patient has a Health Care Directive or Living Will:  NO    1. NO - Do you have a history of heart attack, stroke, stent, bypass or surgery on an artery in the head, neck, heart or legs?  2. YES - Do you ever have any pain or discomfort in your chest? *Indigestion only  3. NO - Do you have a history of  Heart Failure?  4. NO - Are you troubled by shortness of breath when: walking on the level, up a slight hill or at night?  5. NO - Do you currently have a cold, bronchitis or other respiratory infection?  6. YES - Do you have a cough, shortness of breath or wheezing? *seasonal allergies and asthma (contolled with inhaled medications)  7. NO - Do you sometimes get pains in the calves of your legs when you walk?  8. NO - Do you or anyone in your family have previous history of blood clots?  9. NO - Do you or does anyone in your family have a serious bleeding problem such as prolonged bleeding following surgeries or cuts?  10. YES - Have you ever had problems with anemia or been told to take iron pills? *currently taking iron (latest HGB normal)  11. NO - Have you had any abnormal blood loss such as black, tarry or bloody stools, or abnormal vaginal bleeding?  12. NO - Have you  ever had a blood transfusion?  13. NO - Have you or any of your relatives ever had problems with anesthesia?  14. NO - Do you have sleep apnea, excessive snoring or daytime drowsiness?  15. NO - Do you have any prosthetic heart valves?  16. NO - Do you have prosthetic joints?  17. NO - Is there any chance that you may be pregnant?      HPI:     HPI related to upcoming procedure: Alize recently experienced a spontaneous retinal detachment in her right eye. She will have surgery for this in 3 days. While being worked up for this, it was discovered that she has excessive latticing in left eye which will be addressed during another procedure a couple weeks later.     See problem list for active medical problems.  Problems all longstanding and stable, except as noted/documented.  See ROS for pertinent symptoms related to these conditions.      MEDICAL HISTORY:     Patient Active Problem List    Diagnosis Date Noted     Alcohol use disorder, moderate, in controlled environment (H) 03/19/2019     Priority: Medium     Menorrhagia with regular cycle 03/19/2019     Priority: Medium     Encounter for vitamin deficiency screening 03/19/2019     Priority: Medium     Iron deficiency anemia due to chronic blood loss 03/18/2019     Priority: Medium     Anti-TPO antibodies present 11/17/2014     Priority: Medium     Overview:   Has normal TSH.  Recommend annual TSH cascade.       Class 2 obesity without serious comorbidity with body mass index (BMI) of 35.0 to 35.9 in adult 11/06/2014     Priority: Medium     Overview:   BMI 35       Mild intermittent asthma with acute exacerbation 08/07/2014     Priority: Medium     Anxiety 01/19/2013     Priority: Medium     PMS (premenstrual syndrome) 08/04/2012     Priority: Medium      Past Medical History:   Diagnosis Date     Allergy, unspecified not elsewhere classified     seasonal     Asthma 2012     Past Surgical History:   Procedure Laterality Date     LAPAROSCOPIC TUBAL LIGATION  Bilateral      Current Outpatient Medications   Medication Sig Dispense Refill     ADVIL 200 MG OR TABS 1 TABLET EVERY 4 TO 6 HOURS AS NEEDED       cyanocobalamin (VITAMIN B-12) 1000 MCG tablet Take by mouth daily       diazepam (VALIUM) 5 MG tablet Take 1 tablet (5 mg) by mouth every 8 hours as needed for anxiety 4 tablet 0     EXCEDRIN EXTRA STRENGTH OR 2 TABLETS EVERY 6 HOURS AS NEEDED       ferrous sulfate (FE TABS) 325 (65 Fe) MG EC tablet Take 325 mg by mouth every 24 hours       fluticasone (FLONASE) 50 MCG/ACT nasal spray Spray 2 sprays in nostril every 24 hours       fluticasone-vilanterol (BREO ELLIPTA) 200-25 MCG/INH inhaler Inhale 1 puff into the lungs daily 1 Inhaler 5     gabapentin (NEURONTIN) 300 MG capsule Take 1 capsule (300 mg) by mouth 3 times daily 30 capsule 0     hydrochlorothiazide (HYDRODIURIL) 12.5 MG tablet Take 1 tablet (12.5 mg) by mouth daily 90 tablet 0     levalbuterol (XOPENEX HFA) 45 MCG/ACT inhaler Inhale 2 puffs into the lungs every 4 hours as needed for shortness of breath / dyspnea or wheezing 15 g 1     Multiple Vitamins-Minerals (MULTIVITAMIN ADULT PO)        naltrexone (DEPADE/REVIA) 50 MG tablet Take 1 tablet (50 mg) by mouth daily 30 tablet 0     omega 3 1000 MG CAPS Take 2 g by mouth       vitamin C (ASCORBIC ACID) 500 MG tablet Take 1 tablet (500 mg) by mouth daily       vitamin D3 (CHOLECALCIFEROL) 1000 units (25 mcg) tablet        OTC products: None, except as noted above    No Known Allergies   Latex Allergy: NO    Social History     Tobacco Use     Smoking status: Former Smoker     Packs/day: 0.50     Years: 17.00     Pack years: 8.50     Types: Cigarettes     Last attempt to quit: 3/19/2012     Years since quittin.5     Smokeless tobacco: Never Used   Substance Use Topics     Alcohol use: Yes     Frequency: 4 or more times a week     Drinks per session: 3 or 4     Binge frequency: Weekly     Comment: 12 ounces of liquor per day     History   Drug Use No        REVIEW OF SYSTEMS:   CONSTITUTIONAL: NEGATIVE for fever, chills, change in weight  INTEGUMENTARY/SKIN: NEGATIVE for worrisome rashes, moles or lesions  EYES: See HPI   ENT/MOUTH: NEGATIVE for ear, mouth and throat problems  RESP: NEGATIVE for significant cough or SOB  BREAST: NEGATIVE for masses, tenderness or discharge  CV: NEGATIVE for chest pain, palpitations or peripheral edema  GI: NEGATIVE for nausea, abdominal pain, heartburn, or change in bowel habits  : NEGATIVE for frequency, dysuria, or hematuria  MUSCULOSKELETAL: NEGATIVE for significant arthralgias or myalgia  NEURO: NEGATIVE for weakness, dizziness or paresthesias  ENDOCRINE: NEGATIVE for temperature intolerance, skin/hair changes  HEME: NEGATIVE for bleeding problems  PSYCHIATRIC: NEGATIVE for changes in mood or affect    EXAM:   /80 (BP Location: Right arm, Patient Position: Chair, Cuff Size: Adult Regular)   Pulse 84   Temp 98.6  F (37  C) (Tympanic)   Wt 98.9 kg (218 lb)   LMP 09/09/2019   BMI 33.89 kg/m      GENERAL APPEARANCE: healthy, alert and no distress     EYES: EOMI, PERRL     HENT: ear canals and TM's normal and nose and mouth without ulcers or lesions     NECK: no adenopathy, no asymmetry, masses, or scars and thyroid normal to palpation     RESP: lungs clear to auscultation - no rales, rhonchi or wheezes     CV: regular rates and rhythm, normal S1 S2, no S3 or S4 and no murmur, click or rub     ABDOMEN:  soft, nontender, no HSM or masses and bowel sounds normal     MS: extremities normal- no gross deformities noted, no evidence of inflammation in joints, FROM in all extremities.     SKIN: no suspicious lesions or rashes     NEURO: Normal strength and tone, sensory exam grossly normal, mentation intact and speech normal     PSYCH: mentation appears normal. and affect normal/bright     LYMPHATICS: No cervical adenopathy    DIAGNOSTICS:   No EKG indicated    Last Comprehensive Metabolic Panel:  Sodium   Date Value Ref  Range Status   09/27/2019 134 133 - 144 mmol/L Final     Potassium   Date Value Ref Range Status   09/27/2019 3.5 3.4 - 5.3 mmol/L Final     Chloride   Date Value Ref Range Status   09/27/2019 102 94 - 109 mmol/L Final     Carbon Dioxide   Date Value Ref Range Status   09/27/2019 23 20 - 32 mmol/L Final     Anion Gap   Date Value Ref Range Status   09/27/2019 9 3 - 14 mmol/L Final     Glucose   Date Value Ref Range Status   09/27/2019 96 70 - 99 mg/dL Final     Comment:     Non Fasting     Urea Nitrogen   Date Value Ref Range Status   09/27/2019 14 7 - 30 mg/dL Final     Creatinine   Date Value Ref Range Status   09/27/2019 0.77 0.52 - 1.04 mg/dL Final     GFR Estimate   Date Value Ref Range Status   09/27/2019 >90 >60 mL/min/[1.73_m2] Final     Comment:     Non  GFR Calc  Starting 12/18/2018, serum creatinine based estimated GFR (eGFR) will be   calculated using the Chronic Kidney Disease Epidemiology Collaboration   (CKD-EPI) equation.       Calcium   Date Value Ref Range Status   09/27/2019 8.9 8.5 - 10.1 mg/dL Final     Lab Results   Component Value Date    WBC 5.1 09/27/2019     Lab Results   Component Value Date    RBC 4.47 09/27/2019     Lab Results   Component Value Date    HGB 12.2 09/27/2019     Lab Results   Component Value Date    HCT 37.1 09/27/2019     Lab Results   Component Value Date    MCV 83 09/27/2019     Lab Results   Component Value Date    MCH 27.3 09/27/2019     Lab Results   Component Value Date    MCHC 32.9 09/27/2019     Lab Results   Component Value Date    RDW 15.8 09/27/2019     Lab Results   Component Value Date     09/27/2019       IMPRESSION:   Reason for surgery/procedure: Retinal detachment  Diagnosis/reason for consult: Preoperative clearance    The proposed surgical procedure is considered LOW risk.    REVISED CARDIAC RISK INDEX  The patient has the following serious cardiovascular risks for perioperative complications such as (MI, PE, VFib and 3  AV  Block):  No serious cardiac risks  INTERPRETATION: 0 risks: Class I (very low risk - 0.4% complication rate)    The patient has the following additional risks for perioperative complications:  No identified additional risks      ICD-10-CM    1. Preop general physical exam Z01.818    2. Right retinal detachment H33.21    3. Essential hypertension I10 Basic metabolic panel  (Ca, Cl, CO2, Creat, Gluc, K, Na, BUN)   4. Iron deficiency anemia due to chronic blood loss D50.0 CBC with platelets and differential   5. History of hypothyroidism Z86.39 TSH with free T4 reflex       RECOMMENDATIONS:     Alize has asthma. This is well-controlled, but it flares a bit more in the fall. She will bring her inhalers on the day of the procedure.    --Patient is to take all scheduled medications on the day of surgery EXCEPT for modifications listed below.    APPROVAL GIVEN to proceed with proposed procedure, without further diagnostic evaluation       Signed Electronically by: Karson Youngblood NP    Copy of this evaluation report is provided to requesting physician.    [facility] Preop Guidelines    Revised Cardiac Risk Index

## 2019-11-06 ENCOUNTER — HEALTH MAINTENANCE LETTER (OUTPATIENT)
Age: 39
End: 2019-11-06

## 2019-12-05 DIAGNOSIS — I10 ESSENTIAL HYPERTENSION: ICD-10-CM

## 2019-12-05 RX ORDER — HYDROCHLOROTHIAZIDE 12.5 MG/1
TABLET ORAL
Qty: 90 TABLET | Refills: 0 | Status: SHIPPED | OUTPATIENT
Start: 2019-12-05 | End: 2020-03-17

## 2019-12-05 NOTE — TELEPHONE ENCOUNTER
Prescription approved per [facility] Refill Protocol.  JERI MilnerN, RN  [facility]

## 2019-12-05 NOTE — TELEPHONE ENCOUNTER
"Requested Prescriptions   Pending Prescriptions Disp Refills     hydrochlorothiazide (HYDRODIURIL) 12.5 MG tablet [Pharmacy Med Name: HYDROCHLOROTHIAZIDE 12.5MG TABS]  Last Written Prescription Date:  07/10/2019  Last Fill Quantity: 90 tablet,  # refills: 0   Last Office Visit: 9/27/2019 Karson Youngblood NP   Future Office Visit:      90 tablet 0     Sig: TAKE ONE TABLET BY MOUTH ONCE DAILY       Diuretics (Including Combos) Protocol Passed - 12/5/2019  9:29 AM        Passed - Blood pressure under 140/90 in past 12 months     BP Readings from Last 3 Encounters:   09/27/19 118/80   06/28/19 110/60   06/11/19 (!) 142/94           Passed - Recent (12 mo) or future (30 days) visit within the authorizing provider's specialty     Patient has had an office visit with the authorizing provider or a provider within the authorizing providers department within the previous 12 mos or has a future within next 30 days. See \"Patient Info\" tab in inbasket, or \"Choose Columns\" in Meds & Orders section of the refill encounter.              Passed - Medication is active on med list        Passed - Patient is age 18 or older        Passed - No active pregancy on record        Passed - Normal serum creatinine on file in past 12 months     Recent Labs   Lab Test 09/27/19  1000   CR 0.77              Passed - Normal serum potassium on file in past 12 months     Recent Labs   Lab Test 09/27/19  1000   POTASSIUM 3.5              Passed - Normal serum sodium on file in past 12 months     Recent Labs   Lab Test 09/27/19  1000                 Passed - No positive pregnancy test in past 12 months          "

## 2020-01-30 ENCOUNTER — OFFICE VISIT (OUTPATIENT)
Dept: FAMILY MEDICINE | Facility: CLINIC | Age: 40
End: 2020-01-30
Payer: COMMERCIAL

## 2020-01-30 VITALS
WEIGHT: 212 LBS | HEART RATE: 96 BPM | SYSTOLIC BLOOD PRESSURE: 124 MMHG | HEIGHT: 67 IN | DIASTOLIC BLOOD PRESSURE: 70 MMHG | BODY MASS INDEX: 33.27 KG/M2 | OXYGEN SATURATION: 100 % | TEMPERATURE: 98.3 F

## 2020-01-30 DIAGNOSIS — F10.20 ALCOHOL USE DISORDER, MODERATE, IN CONTROLLED ENVIRONMENT (H): ICD-10-CM

## 2020-01-30 DIAGNOSIS — F41.9 ANXIETY: Primary | ICD-10-CM

## 2020-01-30 DIAGNOSIS — M54.50 ACUTE BILATERAL LOW BACK PAIN WITHOUT SCIATICA: ICD-10-CM

## 2020-01-30 PROCEDURE — 99214 OFFICE O/P EST MOD 30 MIN: CPT | Performed by: NURSE PRACTITIONER

## 2020-01-30 RX ORDER — SERTRALINE HYDROCHLORIDE 25 MG/1
25 TABLET, FILM COATED ORAL DAILY
Qty: 30 TABLET | Refills: 3 | Status: SHIPPED | OUTPATIENT
Start: 2020-01-30 | End: 2020-03-25 | Stop reason: SINTOL

## 2020-01-30 RX ORDER — PROPRANOLOL HYDROCHLORIDE 10 MG/1
10 TABLET ORAL 2 TIMES DAILY PRN
Qty: 30 TABLET | Refills: 0 | Status: SHIPPED | OUTPATIENT
Start: 2020-01-30 | End: 2020-03-25 | Stop reason: SINTOL

## 2020-01-30 ASSESSMENT — ANXIETY QUESTIONNAIRES
1. FEELING NERVOUS, ANXIOUS, OR ON EDGE: NEARLY EVERY DAY
IF YOU CHECKED OFF ANY PROBLEMS ON THIS QUESTIONNAIRE, HOW DIFFICULT HAVE THESE PROBLEMS MADE IT FOR YOU TO DO YOUR WORK, TAKE CARE OF THINGS AT HOME, OR GET ALONG WITH OTHER PEOPLE: VERY DIFFICULT
2. NOT BEING ABLE TO STOP OR CONTROL WORRYING: NEARLY EVERY DAY
5. BEING SO RESTLESS THAT IT IS HARD TO SIT STILL: MORE THAN HALF THE DAYS
GAD7 TOTAL SCORE: 17
7. FEELING AFRAID AS IF SOMETHING AWFUL MIGHT HAPPEN: NEARLY EVERY DAY
6. BECOMING EASILY ANNOYED OR IRRITABLE: SEVERAL DAYS
3. WORRYING TOO MUCH ABOUT DIFFERENT THINGS: NEARLY EVERY DAY

## 2020-01-30 ASSESSMENT — MIFFLIN-ST. JEOR: SCORE: 1668.22

## 2020-01-30 ASSESSMENT — PATIENT HEALTH QUESTIONNAIRE - PHQ9
5. POOR APPETITE OR OVEREATING: MORE THAN HALF THE DAYS
SUM OF ALL RESPONSES TO PHQ QUESTIONS 1-9: 7

## 2020-01-30 NOTE — LETTER
January 30, 2020      Alize Barrett  [address] [address]        To Whom It May Concern:    Alize Barrett was seen in our clinic. Please excuse from work on 1/31-2/2 due to injury. May return to next normal scheduled day after this absence.    Sincerely,        Agatha Sims, CNP

## 2020-01-30 NOTE — PATIENT INSTRUCTIONS
Sertraline started today. Sometimes in the first two weeks of taking this medication you will have headache, lack of sleep or too much sleep, dizziness or stomach upset.     These typically go away or decrease within about 2 weeks. If symptoms occur but are minor please give them time to improve.    We would like to follow up with you to see how medication is working in about 4 weeks. Let us know sooner if there are any problems.     Continue to see your counselor.     When You Have Low Back Pain    Caring for Your Back  You are not alone.    Low back pain is very common. Nearly half of all adults have low back pain in any given year. The good news is that back pain is rarely a danger to your health. Most people can manage their back pain on their own and about half of them start feeling better within 2 weeks. In 9 out of 10 cases, low back pain goes away or no longer limits daily activity within 6 weeks.     Your outlook is good!    Your symptoms tell us that your low back pain is most likely not a danger to you. Most of the time we do not know the exact cause of low back pain, even if you see a doctor or have an MRI. However, treatment can still work without knowing the cause of the pain. Less than 1 in 100 people need surgery for their back pain.     What can I do about my low back pain?     There are three things you can do to ease low back pain and help it go away.    Use heat or cold packs.    Take medicine as directed.    Use positions, movements and exercises.     Using heat or cold packs    Try cold packs or gentle heat to ease your pain. Use whichever gives you the most relief. Apply the cold pack or heat for 15 minutes at a time, as often as needed.    Taking medicine      If your doctor has prescribed medicine, be sure to follow the directions.    If you take over-the-counter medicine, read and follow the directions.    Talk to your doctor if you have any questions.     Using positions, movements and  exercises    Research tells us that moving your joints and muscles can help you recover from back pain. Such activity should be simple and gentle. Use the positions in the photos as well as walking to help relieve your pain. Try taking a short walk every 3 to 4 hours during the day. Walk for a few minutes inside your home or take longer walks outside, on a treadmill or at a mall. Slowly increase the amount of time you walk. Expect discomfort when you begin, but it should lessen as your back starts to heal. When your back feels better, walk daily to keep your back and body healthy.    Finding a comfortable position    When your back pain is new, certain positions will ease your pain. Gently try each of the positions below until you find one that is helpful. Once you find a position of comfort, use it as often as you like when you are resting. You will recover faster if you combine rest with activity.         Lie on your back with your legs bent. You can do this by placing a pillow under your knees. Or you may lie on the floor and rest your lower legs on the seat of a chair.       Lie on your side with your knees bent, and place a pillow between your knees.       Lie on your stomach over pillows.      When should I call my doctor?    Your back pain should improve over the first couple of weeks. As it improves, you should be able to return to your normal activities. But call your doctor if:    You have a sudden change in your ability to control your bladder or bowels.    You feel tingling in your groin or legs.    The pain spreads down your leg and into your foot.    Your toes, feet or leg muscles feel weak.    You feel generally unwell or sick.    Your pain does not get better or gets worse.      If you are deaf or hard of hearing, please let us know. We provide many free services including sign language interpreters,oral interpreters, TTYs, telephone amplifiers, note takers and written materials.    For informational  purposes only. Not to replace the advice of your health care provider. Copyright   2013 [employer]. All rights reserved. Member [employer]k [id number] - Rev 06/14.

## 2020-01-30 NOTE — PROGRESS NOTES
Subjective   Alize Barrett is a 39 year old female who presents to clinic today for the following health issues:    HPI   Anxiety Follow-Up    How are you doing with your anxiety since your last visit? Worsened     Are you having other symptoms that might be associated with anxiety? Yes:  panic attack with chest pains, rapid heart rate, and tremors.     Have you had a significant life event? OTHER: Job has been very stressful lately.     Are you feeling depressed? No    Do you have any concerns with your use of alcohol or other drugs? Yes:  has been trying to quit alcohol     Currently drinking about 8-10 oz liquor daily in the evenings or after work depending on her shift. Never drinking prior to work. Had been sober for a couple weeks but anxiety much worse without alcohol. She wants to stop drinking but needs help with anxiety. Having panic attacks in her car x2.     Social History     Tobacco Use     Smoking status: Former Smoker     Packs/day: 0.50     Years: 17.00     Pack years: 8.50     Types: Cigarettes     Last attempt to quit: 3/19/2012     Years since quittin.8     Smokeless tobacco: Never Used   Substance Use Topics     Alcohol use: Yes     Frequency: 4 or more times a week     Drinks per session: 3 or 4     Binge frequency: Weekly     Comment: 12 ounces of liquor per day     Drug use: No     ARMANDO-7 SCORE 2020   Total Score 17     PHQ 2020   PHQ-9 Total Score 7   Q9: Thoughts of better off dead/self-harm past 2 weeks Not at all     Last PHQ-9 2020   1.  Little interest or pleasure in doing things 0   2.  Feeling down, depressed, or hopeless 0   3.  Trouble falling or staying asleep, or sleeping too much 2   4.  Feeling tired or having little energy 0   5.  Poor appetite or overeating 2   6.  Feeling bad about yourself 1   7.  Trouble concentrating 0   8.  Moving slowly or restless 2   Q9: Thoughts of better off dead/self-harm past 2 weeks 0   PHQ-9 Total Score 7   Difficulty at  work, home, or with people Not difficult at all     ARMANDO-7  1/30/2020   1. Feeling nervous, anxious, or on edge 3   2. Not being able to stop or control worrying 3   3. Worrying too much about different things 3   4. Trouble relaxing 2   5. Being so restless that it is hard to sit still 2   6. Becoming easily annoyed or irritable 1   7. Feeling afraid, as if something awful might happen 3   ARMANDO-7 Total Score 17   If you checked any problems, how difficult have they made it for you to do your work, take care of things at home, or get along with other people? Very difficult       Back Pain       Duration: 3 weeks         Specific cause: none    Description:   Location of pain: low back middle   Character of pain: dull ache  Pain radiation:radiates to the right hip   New numbness or weakness in legs, not attributed to pain:  no     Intensity: moderate    History:   Pain interferes with job: YES  History of back problems: no prior back problems  Any previous MRI or X-rays: Yes- at Chiropractor.  Date 11/2019  Sees a specialist for back pain:  No  Therapies tried without relief: chiropractor, only slightly with ibuprofen     Alleviating factors:   Improved by: none      Precipitating factors:  Worsened by: Bending, walking, and Sitting      Accompanying Signs & Symptoms:  Risk of Fracture:  None  Risk of Cauda Equina:  None  Risk of Infection:  None  Risk of Cancer:  None  Risk of Ankylosing Spondylitis:  Onset at age <35, male, AND morning back stiffness. no     Worse with being on feet all day. Feels like muscle spasm in low back. Glutes feel tight as well like back pain traveling into butt on right side. No shooting pain per patient.     Reviewed and updated as needed this visit by provider:  Tobacco  Allergies  Meds  Problems  Med Hx  Surg Hx  Fam Hx         Review of Systems   Constitutional, HEENT, cardiovascular, pulmonary, GI, , musculoskeletal, neuro, skin, endocrine and psych systems are negative,  "except as otherwise noted per HPI.      Objective   /70 (BP Location: Right arm, Cuff Size: Adult Regular)   Pulse 96   Temp 98.3  F (36.8  C) (Oral)   Ht 1.708 m (5' 7.25\")   Wt 96.2 kg (212 lb)   SpO2 100%   BMI 32.96 kg/m   Body mass index is 32.96 kg/m .  Physical Exam   GENERAL: healthy, alert, well nourished, well hydrated, no distress  HENT: ear canals- normal; TMs- normal; Nose- normal; Mouth- no ulcers, no lesions  NECK: no tenderness, no adenopathy, no asymmetry, no masses, no stiffness; thyroid- normal to palpation  RESP: lungs clear to auscultation - no rales, no rhonchi, no wheezes  CV: regular rates and rhythm, normal S1 S2, no S3 or S4 and no murmur, no click or rub -  Lumber/Thoracic Spine Exam: Tender:  left para lumbar muscles, right para lumbar muscles  Non-tender:  left parathoracic muscles, right parathoracic muscles  Full ROM, but painful with twisting motion, reaching.        Assessment & Plan   Alize was seen today for anxiety and back pain.    Diagnoses and all orders for this visit:    Anxiety  Start sertraline daily. Can use prn propranolol for panic symptoms.   -     sertraline (ZOLOFT) 25 MG tablet; Take 1 tablet (25 mg) by mouth daily  -     propranolol (INDERAL) 10 MG tablet; Take 1 tablet (10 mg) by mouth 2 times daily as needed (anxiety/panic)    Alcohol use disorder, moderate, in controlled environment (H)  Encouraged abstinence from alcohol, patient in agreement. Has started seeing counselor.    Acute bilateral low back pain without sciatica  Low back pain and spasm. Given new anxiety med would like to avoid muscle relaxant. Discussed heat, stretching, aleve. Return or call if persistent.           BMI:   Estimated body mass index is 33.89 kg/m  as calculated from the following:    Height as of 6/28/19: 1.708 m (5' 7.25\").    Weight as of 9/27/19: 98.9 kg (218 lb).           See Patient Instructions    Return in about 6 weeks (around 3/12/2020) for Medication " Managment Visit.            LUIS FERNANDO Leggett     [address] [address]  [email or web address]   Office: [phone]

## 2020-01-31 ASSESSMENT — ASTHMA QUESTIONNAIRES: ACT_TOTALSCORE: 23

## 2020-01-31 ASSESSMENT — ANXIETY QUESTIONNAIRES: GAD7 TOTAL SCORE: 17

## 2020-03-13 DIAGNOSIS — I10 ESSENTIAL HYPERTENSION: ICD-10-CM

## 2020-03-13 NOTE — TELEPHONE ENCOUNTER
"Requested Prescriptions   Pending Prescriptions Disp Refills     hydrochlorothiazide (HYDRODIURIL) 12.5 MG tablet [Pharmacy Med Name: HYDROCHLOROTHIAZIDE 12.5MG TABS]  Last Written Prescription Date:  12/5/2019  Last Fill Quantity: 90 tablet,  # refills: 0   Last office visit: 6/11/2019 with prescribing provider:  Dami     Future Office Visit:       90 tablet 0     Sig: TAKE ONE TABLET BY MOUTH ONCE DAILY       Diuretics (Including Combos) Protocol Passed - 3/13/2020  8:35 AM        Passed - Blood pressure under 140/90 in past 12 months     BP Readings from Last 3 Encounters:   01/30/20 124/70   09/27/19 118/80   06/28/19 110/60                 Passed - Recent (12 mo) or future (30 days) visit within the authorizing provider's specialty     Patient has had an office visit with the authorizing provider or a provider within the authorizing providers department within the previous 12 mos or has a future within next 30 days. See \"Patient Info\" tab in inbasket, or \"Choose Columns\" in Meds & Orders section of the refill encounter.              Passed - Medication is active on med list        Passed - Patient is age 18 or older        Passed - No active pregancy on record        Passed - Normal serum creatinine on file in past 12 months     Recent Labs   Lab Test 09/27/19  1000   CR 0.77              Passed - Normal serum potassium on file in past 12 months     Recent Labs   Lab Test 09/27/19  1000   POTASSIUM 3.5                    Passed - Normal serum sodium on file in past 12 months     Recent Labs   Lab Test 09/27/19  1000                 Passed - No positive pregnancy test in past 12 months           "

## 2020-03-17 RX ORDER — HYDROCHLOROTHIAZIDE 12.5 MG/1
TABLET ORAL
Qty: 90 TABLET | Refills: 1 | Status: SHIPPED | OUTPATIENT
Start: 2020-03-17 | End: 2020-09-11

## 2020-03-17 NOTE — TELEPHONE ENCOUNTER
Prescription approved per [facility] [facility].  Anna Heck RN- Triage [employer]

## 2020-03-25 ENCOUNTER — VIRTUAL VISIT (OUTPATIENT)
Dept: FAMILY MEDICINE | Facility: CLINIC | Age: 40
End: 2020-03-25
Payer: COMMERCIAL

## 2020-03-25 DIAGNOSIS — F10.20 ALCOHOL USE DISORDER, MODERATE, IN CONTROLLED ENVIRONMENT (H): ICD-10-CM

## 2020-03-25 DIAGNOSIS — F41.9 ANXIETY: Primary | ICD-10-CM

## 2020-03-25 PROCEDURE — 99214 OFFICE O/P EST MOD 30 MIN: CPT | Mod: TEL | Performed by: NURSE PRACTITIONER

## 2020-03-25 RX ORDER — HYDROXYZINE PAMOATE 25 MG/1
25 CAPSULE ORAL 3 TIMES DAILY PRN
Qty: 90 CAPSULE | Refills: 0 | Status: SHIPPED | OUTPATIENT
Start: 2020-03-25 | End: 2020-04-13

## 2020-03-25 RX ORDER — ESCITALOPRAM OXALATE 10 MG/1
5-10 TABLET ORAL DAILY
Qty: 30 TABLET | Refills: 0 | Status: SHIPPED | OUTPATIENT
Start: 2020-03-25 | End: 2020-04-13 | Stop reason: SINTOL

## 2020-03-25 ASSESSMENT — ANXIETY QUESTIONNAIRES
2. NOT BEING ABLE TO STOP OR CONTROL WORRYING: MORE THAN HALF THE DAYS
5. BEING SO RESTLESS THAT IT IS HARD TO SIT STILL: SEVERAL DAYS
6. BECOMING EASILY ANNOYED OR IRRITABLE: NOT AT ALL
1. FEELING NERVOUS, ANXIOUS, OR ON EDGE: MORE THAN HALF THE DAYS
3. WORRYING TOO MUCH ABOUT DIFFERENT THINGS: MORE THAN HALF THE DAYS
GAD7 TOTAL SCORE: 11
7. FEELING AFRAID AS IF SOMETHING AWFUL MIGHT HAPPEN: MORE THAN HALF THE DAYS
IF YOU CHECKED OFF ANY PROBLEMS ON THIS QUESTIONNAIRE, HOW DIFFICULT HAVE THESE PROBLEMS MADE IT FOR YOU TO DO YOUR WORK, TAKE CARE OF THINGS AT HOME, OR GET ALONG WITH OTHER PEOPLE: SOMEWHAT DIFFICULT

## 2020-03-25 ASSESSMENT — PATIENT HEALTH QUESTIONNAIRE - PHQ9
SUM OF ALL RESPONSES TO PHQ QUESTIONS 1-9: 5
5. POOR APPETITE OR OVEREATING: MORE THAN HALF THE DAYS

## 2020-03-25 NOTE — PROGRESS NOTES
TELEPHONE VISIT    Alize Barrett is a 39 year old female who is being evaluated via a telephone visit.      S: The history as provided by the patient to the provider during this call include     Depression and Anxiety Follow-Up    How are you doing with your depression since your last visit? No change    How are you doing with your anxiety since your last visit?  Worsened      Are you having other symptoms that might be associated with depression or anxiety? Yes:  heart racing randomly, lightheaded -- happened on her way to [employer] today had to turn around and go home    Have you had a significant life event? OTHER: works at [employer]     Do you have any concerns with your use of alcohol or other drugs? Yes:  alcohol - feels when on a decent regimin with rx meds will not continue to drink     Last office visit was prescribed propranolol and sertraline.  Reports propranolol made her dizzy. She stopped taking Sertraline 25mg due to HA - HA stopped after a few days of stopping    Panis disorder  -- was Lorazepam at night and that did help -not taking during the day    Feels some shortness of breath with her increasing anxiety - feels tight chested-Xopenex helps this.     Social History     Tobacco Use     Smoking status: Former Smoker     Packs/day: 0.50     Years: 17.00     Pack years: 8.50     Types: Cigarettes     Last attempt to quit: 3/19/2012     Years since quittin.0     Smokeless tobacco: Never Used   Substance Use Topics     Alcohol use: Yes     Frequency: 4 or more times a week     Drinks per session: 3 or 4     Binge frequency: Weekly     Comment: 12 ounces of liquor per day     Drug use: No     PHQ 2020 3/25/2020   PHQ-9 Total Score 7 5   Q9: Thoughts of better off dead/self-harm past 2 weeks Not at all Not at all     ARMANDO-7 SCORE 2020 3/25/2020   Total Score 17 11       PHQ 2020 3/25/2020   PHQ-9 Total Score 7 5   Q9: Thoughts of better off dead/self-harm past 2 weeks Not at all  Not at all       ARMANDO-7 SCORE 1/30/2020 3/25/2020   Total Score 17 11       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 Significantly increased anxiety due to coronavirus.     Pertinent parts of the the patient's medical history reviewed and confirmed by the provider included   I have reviewed the note as documented above.  This accurately captures the substance of my conversation with the patient    Reviewed and updated as needed this visit by provider:  Tobacco  Allergies  Meds  Problems  Med Hx  Surg Hx  Fam Hx         Review of Systems   Constitutional, HEENT, cardiovascular, pulmonary, GI, , musculoskeletal, neuro, skin, endocrine and psych systems are negative, except as otherwise noted.      Objective   There were no vitals taken for this visit. There is no height or weight on file to calculate BMI.  Physical Exam       Assessment & Plan   Diagnoses and all orders for this visit:    Anxiety  Will start Lexapro today.  Starting low dose increasing slowly in a hope to minimize side effects.   Discussed side effects related to starting a new medication-sometimes in the first two weeks of taking this medication you can have headache, lack of sleep or too much sleep, dizziness or stomach upset.   These typically go away or decrease within about 2 weeks. If symptoms occur but are minor please give them time to improve.  We will also try Vistaril as needed 3 times daily.  Encourage her to try initially at bedtime to see how she does with the potential of tiredness due to this medication.  We would like to follow up with you to see how medication is working in about 2-4 weeks. Let us know sooner if there are any problems.   Red flag symptoms discussed including increasing anxiety, chest pain or shortness of breath, and/or suicidal or homicidal ideation.  If red flags exist she is to proceed to the emergency room or call 911.  Alize verbalizes understanding of plan of care and is in  "agreement.   -     escitalopram (LEXAPRO) 10 MG tablet; Take 0.5-1 tablets (5-10 mg) by mouth daily Take 5mg (1/2 tab) for 7 days then increase to 10mg daily.  -     hydrOXYzine (VISTARIL) 25 MG capsule; Take 1 capsule (25 mg) by mouth 3 times daily as needed for anxiety    Alcohol use disorder, moderate, in controlled environment (H)  Discouraged use of alcohol at this time in particular with her medications.    Patient reports she is okay with this and does not drink when her anxiety is under good control.    Support groups counseling AA are options she declines need at this time.    BMI:   Estimated body mass index is 32.96 kg/m  as calculated from the following:    Height as of 1/30/20: 1.708 m (5' 7.25\").    Weight as of 1/30/20: 96.2 kg (212 lb).     See Patient Instructions    No follow-ups on file.     Agatha Zamorano, FNP-BC     [address] [address]  [email or web address]   Office: [phone]      Total time of call between patient and provider was 10 minutes     CONSENT    \"We have found that certain health care needs can be provided without the need for a physical exam.  This service lets us provide the care you need with a short phone conversation.  If a prescription is necessary we can send it directly to your pharmacy.  If lab work is needed we can place an order for that and you can then stop by our lab to have the test done at a later time.    This telephone visit will be conducted via 3 way call with the you (the patient) , the physician/provider, and a me all on the line at the same time.  This allows your physician/provider to have the phone conversation with you while I will be taking notes for your medical record.  We will have full access to your [facility] medical record during this entire phone call.    Since this is like an office visit,  will bill your insurance company for this service.  Please check with your medical " "insurance if this type of telephone/virtual is covered . You may be responsible for the cost of this service if insurance coverage is denied.  The typical cost is $30 (10min), $59(11-20min) and $85 (21-30min)     If during the course of the call the physician/provider feels a telephone visit is not appropriate, you will not be charged for this service\"    Consent has been obtained for this service by care team member: yes.  See the scanned image in the medical record.      The patient has been notified of following (by M.A)            "

## 2020-03-26 ASSESSMENT — ANXIETY QUESTIONNAIRES: GAD7 TOTAL SCORE: 11

## 2020-04-03 ENCOUNTER — TELEPHONE (OUTPATIENT)
Dept: FAMILY MEDICINE | Facility: CLINIC | Age: 40
End: 2020-04-03

## 2020-04-03 DIAGNOSIS — J45.20 MILD INTERMITTENT ASTHMA, UNSPECIFIED WHETHER COMPLICATED: Primary | ICD-10-CM

## 2020-04-03 NOTE — TELEPHONE ENCOUNTER
Routing to PCP to review and advise.  Do you want to start PA or prescribe alternative?    JERI Bonner, RN, PHN  [facility]  Office: [phone]  Fax: [phone]

## 2020-04-03 NOTE — TELEPHONE ENCOUNTER
Prior Authorization Retail Medication Request    Medication/Dose: Insurance is requiring prequisite therapy on Levalbuterol Tartrate. Need PA or alternative  ICD code (if different than what is on RX):    Previously Tried and Failed:  Unknown   Rationale: Unknown    Insurance Name:  [facility]  Insurance ID:  [id number]    Thank You,  Lakeshia Chacon [facility]  [phone]

## 2020-04-03 NOTE — TELEPHONE ENCOUNTER
Please start PA-check with patient on other inhaler use please in past. Uses for mild intermittent asthma.

## 2020-04-06 NOTE — TELEPHONE ENCOUNTER
Prior Authorization Approval    Authorization Effective Date: 4/6/2020  Authorization Expiration Date: 4/6/2021  Medication: Levalbuterol Tartrate. -APPROVED  Approved Dose/Quantity:   Reference #:     Insurance Company: [employer] - Phone [phone] Fax [phone]  Expected CoPay:       CoPay Card Available:      Foundation Assistance Needed:    Which Pharmacy is filling the prescription (Not needed for infusion/clinic administered): [facility] - [place], [place] - [address] [place] [address]  Pharmacy Notified: Yes  Patient Notified: No    Pharmacy will notify patient when medication is ready.

## 2020-04-06 NOTE — TELEPHONE ENCOUNTER
Central Prior Authorization Team   Phone: [phone]      PA Initiation    Medication: Levalbuterol Tartrate. -Initiated  Insurance Company: [employer] - Phone [phone] Fax [phone]  Pharmacy Filling the Rx: [facility] - [place], [place] - [address] [place] [address]  Filling Pharmacy Phone: [phone]  Filling Pharmacy Fax:    Start Date: 4/6/2020

## 2020-04-13 ENCOUNTER — VIRTUAL VISIT (OUTPATIENT)
Dept: FAMILY MEDICINE | Facility: CLINIC | Age: 40
End: 2020-04-13
Payer: COMMERCIAL

## 2020-04-13 DIAGNOSIS — F10.20 ALCOHOL USE DISORDER, MODERATE, IN CONTROLLED ENVIRONMENT (H): ICD-10-CM

## 2020-04-13 DIAGNOSIS — F41.9 ANXIETY: Primary | ICD-10-CM

## 2020-04-13 PROCEDURE — 99213 OFFICE O/P EST LOW 20 MIN: CPT | Mod: TEL | Performed by: NURSE PRACTITIONER

## 2020-04-13 PROCEDURE — 96127 BRIEF EMOTIONAL/BEHAV ASSMT: CPT | Mod: TEL | Performed by: NURSE PRACTITIONER

## 2020-04-13 RX ORDER — PHENOL 1.4 %
10 AEROSOL, SPRAY (ML) MUCOUS MEMBRANE
COMMUNITY
End: 2020-06-30

## 2020-04-13 ASSESSMENT — PATIENT HEALTH QUESTIONNAIRE - PHQ9
SUM OF ALL RESPONSES TO PHQ QUESTIONS 1-9: 4
5. POOR APPETITE OR OVEREATING: SEVERAL DAYS

## 2020-04-13 ASSESSMENT — ANXIETY QUESTIONNAIRES
IF YOU CHECKED OFF ANY PROBLEMS ON THIS QUESTIONNAIRE, HOW DIFFICULT HAVE THESE PROBLEMS MADE IT FOR YOU TO DO YOUR WORK, TAKE CARE OF THINGS AT HOME, OR GET ALONG WITH OTHER PEOPLE: SOMEWHAT DIFFICULT
3. WORRYING TOO MUCH ABOUT DIFFERENT THINGS: SEVERAL DAYS
1. FEELING NERVOUS, ANXIOUS, OR ON EDGE: SEVERAL DAYS
5. BEING SO RESTLESS THAT IT IS HARD TO SIT STILL: NOT AT ALL
2. NOT BEING ABLE TO STOP OR CONTROL WORRYING: SEVERAL DAYS
7. FEELING AFRAID AS IF SOMETHING AWFUL MIGHT HAPPEN: SEVERAL DAYS
GAD7 TOTAL SCORE: 5
6. BECOMING EASILY ANNOYED OR IRRITABLE: NOT AT ALL

## 2020-04-13 NOTE — PROGRESS NOTES
"Alize Barrett is a 39 year old female who is being evaluated via a billable telephone visit.      The patient has been notified of following:     \"This telephone visit will be conducted via a call between you and your physician/provider. We have found that certain health care needs can be provided without the need for a physical exam.  This service lets us provide the care you need with a short phone conversation.  If a prescription is necessary we can send it directly to your pharmacy.  If lab work is needed we can place an order for that and you can then stop by our lab to have the test done at a later time.    Telephone visits are billed at different rates depending on your insurance coverage. During this emergency period, for some insurers they may be billed the same as an in-person visit.  Please reach out to your insurance provider with any questions.    If during the course of the call the physician/provider feels a telephone visit is not appropriate, you will not be charged for this service.\"    Patient has given verbal consent for Telephone visit?  Yes    How would you like to obtain your AVS? Matthewt    Sven     Alize Barrett is a 39 year old female who presents to clinic today for the following health issues:    Anxiety Follow-Up    How are you doing with your anxiety since your last visit? Waxes and Wanes -- stopped taking medication due to night murray    Are you having other symptoms that might be associated with anxiety? Yes:  anxiety when going to work as to what she is goign to come across    Have you had a significant life event? OTHER: With what is happening in the world     Are you feeling depressed? No    Do you have any concerns with your use of alcohol or other drugs? No    Social History     Tobacco Use     Smoking status: Former Smoker     Packs/day: 0.50     Years: 17.00     Pack years: 8.50     Types: Cigarettes     Last attempt to quit: 3/19/2012     Years since quittin.0 "     Smokeless tobacco: Never Used   Substance Use Topics     Alcohol use: Yes     Frequency: 4 or more times a week     Drinks per session: 3 or 4     Binge frequency: Weekly     Comment: 12 ounces of liquor per day     Drug use: No     ARMANDO-7 SCORE 1/30/2020 3/25/2020 4/13/2020   Total Score 17 11 5     PHQ 1/30/2020 3/25/2020 4/13/2020   PHQ-9 Total Score 7 5 4   Q9: Thoughts of better off dead/self-harm past 2 weeks Not at all Not at all Not at all         How many servings of fruits and vegetables do you eat daily?  4 or more + Keto diet    On average, how many sweetened beverages do you drink each day (Examples: soda, juice, sweet tea, etc.  Do NOT count diet or artificially sweetened beverages)?   0    How many days per week do you exercise enough to make your heart beat faster? Non eat this time    How many minutes a day do you exercise enough to make your heart beat faster? n/a    How many days per week do you miss taking your medication? 0    Had a nightmare she is unsure if it was related to starting Lexapro so she stopped it. Symptoms have improved. She feels a better sense of calm. Sleeping well with melatonin 10mg. She cut back some work shifts to 4 hours.Was seeing counselor in Feb and she feels it did not help.    Alcohol-she has been decreasing daily alcohol usage to 3-4 shots of vodka,not every day, chased with seltzer water. She is working towards stopping all together now. She would like to do this on her own.   Last June had assistance for alcoholism-took gabapentin and valium for detox-[facility]. She reports comments made from this clinic that made her worry about her job. She denies any drinking in am, prior to work shifts or driving. She has concern to be in a program if she would lose her job from it.     Patient Active Problem List   Diagnosis     Anti-TPO antibodies present     Anxiety     Class 2 obesity without serious comorbidity with body mass index (BMI) of 35.0 to 35.9 in  adult     Iron deficiency anemia due to chronic blood loss     Mild intermittent asthma with acute exacerbation     PMS (premenstrual syndrome)     Alcohol use disorder, moderate, in controlled environment (H)     Menorrhagia with regular cycle     Encounter for vitamin deficiency screening     Past Surgical History:   Procedure Laterality Date     LAPAROSCOPIC TUBAL LIGATION Bilateral        Social History     Tobacco Use     Smoking status: Former Smoker     Packs/day: 0.50     Years: 17.00     Pack years: 8.50     Types: Cigarettes     Last attempt to quit: 3/19/2012     Years since quittin.0     Smokeless tobacco: Never Used   Substance Use Topics     Alcohol use: Yes     Frequency: 4 or more times a week     Drinks per session: 3 or 4     Binge frequency: Weekly     Comment: 12 ounces of liquor per day     Family History   Problem Relation Age of Onset     Hypertension Mother      Kidney failure Father 32     Coronary Artery Disease Maternal Grandmother      Diabetes Maternal Grandmother      Cancer Maternal Grandmother         possibly gynecologist but unsure     Alcoholism Maternal Grandfather      Kidney Disease Paternal Grandmother      Colon Cancer No family hx of      Breast Cancer No family hx of          Current Outpatient Medications   Medication Sig Dispense Refill     cyanocobalamin (VITAMIN B-12) 1000 MCG tablet Take by mouth daily       ferrous sulfate (FE TABS) 325 (65 Fe) MG EC tablet Take 325 mg by mouth every 24 hours       fluticasone (FLONASE) 50 MCG/ACT nasal spray Spray 2 sprays in nostril every 24 hours       fluticasone-vilanterol (BREO ELLIPTA) 200-25 MCG/INH inhaler Inhale 1 puff into the lungs daily 1 Inhaler 5     hydrochlorothiazide (HYDRODIURIL) 12.5 MG tablet TAKE ONE TABLET BY MOUTH ONCE DAILY 90 tablet 1     levalbuterol (XOPENEX HFA) 45 MCG/ACT inhaler INHALE 2 PUFFS INTO THE LUNGS EVERY 4 HOURS AS NEEDED FOR SHORTNESS OF BREATH/ DYSPNEA OR WHEEZING 15 g 1     Melatonin  10 MG TABS tablet Take 10 mg by mouth nightly as needed for sleep       Multiple Vitamins-Minerals (MULTIVITAMIN ADULT PO)        omega 3 1000 MG CAPS Take 2 g by mouth       vitamin D3 (CHOLECALCIFEROL) 1000 units (25 mcg) tablet          Reviewed and updated as needed this visit by Provider  Tobacco  Allergies  Meds  Problems  Med Hx  Surg Hx  Fam Hx       Review of Systems   Constitutional, HEENT, cardiovascular, pulmonary, GI, , musculoskeletal, neuro, skin, endocrine and psych systems are negative, except as otherwise noted in the HPI.     Objective   Reported vitals:  There were no vitals taken for this visit.   healthy, alert, no distress and cooperative  PSYCH: Alert and oriented times 3; coherent speech, normal   rate and volume, able to articulate logical thoughts, able   to abstract reason, no tangential thoughts, no hallucinations   or delusions  Her affect is normal and pleasant  RESP: No cough, no audible wheezing, able to talk in full sentences  Remainder of exam unable to be completed due to telephone visits      Assessment/Plan:  1. Anxiety  Symptoms improved. Melatonin for sleep.   Saw counselor in Feb and she feels it did not help. EAP services through work versus mental health counseling referral offered- declines at this time.   Continue to closely monitor.    2. Alcohol use disorder, moderate, in controlled environment (H)  Reassured help provided for alcohol will not affect her job and is strictly confidenital. Options discussed and I will research this further.   Improving-has decreased alcohol amount. Encouraged to continue to decrease amount.   Continue to closely monitor-will set up office visit next week.   Referral to addiction medicine or AA declined today.     Return in about 10 days (around 4/23/2020) for Recheck.      Phone call duration: 27  Minutes  45 seconds      PAMELLA Paul-BC

## 2020-04-14 ASSESSMENT — ANXIETY QUESTIONNAIRES: GAD7 TOTAL SCORE: 5

## 2020-05-10 ENCOUNTER — VIRTUAL VISIT (OUTPATIENT)
Dept: FAMILY MEDICINE | Facility: OTHER | Age: 40
End: 2020-05-10

## 2020-05-10 NOTE — PROGRESS NOTES
"Date: 05/10/2020 10:44:13  Clinician: Juan Muñoz  Clinician NPI: [id number]  Patient: Alize Barrett  Patient : 1980  Patient Address: [address] [place] [address]  Patient Phone: [phone]  Visit Protocol: URI  Patient Summary:  Alize is a 39 year old ( : 1980 ) female who initiated a Visit for COVID-19 (Coronavirus) evaluation and screening. When asked the question \"Please sign me up to receive news, health information and promotions from [employer].\", Alize responded \"No\".    Alize states her symptoms started 1-2 days ago.   Her symptoms consist of myalgia, rhinitis, a cough, a headache, and malaise. She is experiencing mild difficulty breathing with activities but can speak normally in full sentences.   Symptom details     Nasal secretions: The color of her mucus is white.    Cough: Alize coughs a few times an hour and her cough is more bothersome at night. Phlegm does not come into her throat when she coughs. She does not believe her cough is caused by post-nasal drip.     Headache: She states the headache is moderate (4-6 on a 10 point pain scale).      Alize denies having fever, facial pain or pressure, sore throat, nasal congestion, vomiting, nausea, teeth pain, ageusia, anosmia, diarrhea, ear pain, wheezing, and chills. She also denies taking antibiotic medication for the symptoms and having recent facial or sinus surgery in the past 60 days.   Precipitating events  She has not recently been exposed to someone with influenza. Alize has been in close contact with the following high risk individuals: adults 65 or older, people with asthma, heart disease or diabetes, pregnant women, children under the age of 5, and immunocompromised people.   Pertinent COVID-19 (Coronavirus) information  Alize either works or volunteers as a healthcare worker or a , or works or volunteers in a healthcare facility. She provides direct patient care. 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  My daughter was seen with symptoms and tested. The ER doc suggested that since I have this cough that I be evaluated.   She lives with a healthcare worker.   Alize has not had a close contact with a laboratory-confirmed COVID-19 patient within 14 days of symptom onset. She has had a close contact with a suspected COVID-19 patient within 14 days of symptom onset. She was not exposed at her work. Additional information about contact with COVID-19 (Coronavirus) patient as reported by the patient (free text): Non of my pui's came back positive.  My daughter developed symptoms.   Pertinent medical history  Alize does not get yeast infections when she takes antibiotics.   Alize needs a return to work/school note.   Weight: 215 lbs   Alize does not smoke or use smokeless tobacco.   She denies pregnancy and denies breastfeeding. She has menstruated in the past month.   Weight: 215 lbs    MEDICATIONS: lisinopril-hydrochlorothiazide oral, ALLERGIES: NKDA  Clinician Response:  Dear Alize,   Your symptoms show that you may have coronavirus (COVID-19). This illness can cause fever, cough and trouble breathing. Many people get a mild case and get better on their own. Some people can get very sick.   Will I be tested for COVID-19?  We would recommend you be tested for coronavirus. Here is how to get that scheduled:   Call [phone]. Tell them you were referred by [facility] to have a COVID-19 test. You will be scheduled at one of our [facility] testing locations (drive-up). Please have your [facility] visit information ready when you call including your visit ID number to verify you were referred.    How can I protect others in the meantime?  First, stay home and away from others (self-isolate) until:   You've had no fever---and no medicine that reduces fever---for 3 full days (97  hours). And...    Your other symptoms have gotten better. For example, your cough or breathing has improved. And...    At least 10 days have passed since your symptoms started.   During this time:   Don't go to work, school or anywhere else.     Stay away from others in your home. No hugging, kissing or shaking hands.    Don't let anyone visit.    Cover your mouth and nose with a mask, tissue or wash cloth to avoid spreading germs.    Wash your hands and face often. Use soap and water.   How can I take care of myself?  1.Take Tylenol (acetaminophen) for fever or pain. If you have liver or kidney problems, ask your family doctor if it's okay to take Tylenol.   Adults can take either:    650 mg (two 325 mg pills) every 4 to 6 hours, or...    1,000 mg (two 500 mg pills) every 8 hours as needed.     Note: Don't take more than 3,000 mg in one day.   For children, check the Tylenol bottle for the right dose. The dose is based on the child's age or weight.  2.If you have other health problems (like cancer, heart failure, an organ transplant or severe kidney disease): Call your specialty clinic if you don't feel better in the next 2 days.  3.Know when to call 911: If your breathing is so bad that it keeps you from doing normal activities, call 911 or go to the emergency room. Tell them that you've been staying home and may have COVID-19.  4.Sign up for [employer]. We know it's scary to hear that you might have COVID-19. We want to track your symptoms to make sure you're okay over the next 2 weeks. Please look for an email from [employer]---this is a free, online program that we'll use to keep in touch. To sign up, follow the link in the email. Learn more at [email or web address].  Where can I get more information?  To learn more about COVID-19 and how to care for yourself at home, please visit the CDC website at https://www.cdc.gov/coronavirus/2019-ncov/about/steps-when-sick.html.  For more about your care  at [facility], please visit [email or web address]     COVID-19 (Coronavirus) General Information  Because there is currently no vaccine to prevent infection, the best way to protect yourself is to avoid being exposed to this virus. Common symptoms of COVID-19 include but are not limited to fever, cough, and shortness of breath. These symptoms appear 2-14 days after you are exposed to the virus that causes COVID-19. Click here for more information from the CDC on how to protect yourself.  If you are sick with COVID-19 or suspect you are infected with the virus that causes COVID-19, follow the steps here from the CDC to help prevent the disease from spreading to people in your home and community.  Click here for general information from the CDC on testing.  If you develop any of these emergency warning signs for COVID-19, get medical attention immediately:     Trouble breathing    Persistent pain or pressure in the chest    New confusion or inability to arouse    Bluish lips or face      Call your doctor or clinic before going in. Call 911 if you have a medical emergency and notify the  you have or think you may have COVID-19.  For more detailed and up to date information on COVID-19 (Coronavirus), please visit the CDC website.   Diagnosis: Cough  Diagnosis ICD: R05

## 2020-05-11 ENCOUNTER — OFFICE VISIT - HEALTHEAST (OUTPATIENT)
Dept: INTERNAL MEDICINE | Facility: CLINIC | Age: 40
End: 2020-05-11

## 2020-05-11 DIAGNOSIS — Z20.822 SUSPECTED 2019 NOVEL CORONAVIRUS INFECTION: ICD-10-CM

## 2020-05-13 ENCOUNTER — COMMUNICATION - HEALTHEAST (OUTPATIENT)
Dept: INTERNAL MEDICINE | Facility: CLINIC | Age: 40
End: 2020-05-13

## 2020-06-30 ENCOUNTER — OFFICE VISIT (OUTPATIENT)
Dept: FAMILY MEDICINE | Facility: CLINIC | Age: 40
End: 2020-06-30
Payer: COMMERCIAL

## 2020-06-30 ENCOUNTER — NURSE TRIAGE (OUTPATIENT)
Dept: NURSING | Facility: CLINIC | Age: 40
End: 2020-06-30

## 2020-06-30 VITALS
OXYGEN SATURATION: 100 % | HEART RATE: 110 BPM | SYSTOLIC BLOOD PRESSURE: 120 MMHG | BODY MASS INDEX: 33.74 KG/M2 | DIASTOLIC BLOOD PRESSURE: 80 MMHG | HEIGHT: 67 IN | TEMPERATURE: 97.5 F | WEIGHT: 215 LBS

## 2020-06-30 DIAGNOSIS — M54.41 ACUTE RIGHT-SIDED LOW BACK PAIN WITH RIGHT-SIDED SCIATICA: Primary | ICD-10-CM

## 2020-06-30 DIAGNOSIS — J45.21 MILD INTERMITTENT ASTHMA WITH ACUTE EXACERBATION: ICD-10-CM

## 2020-06-30 PROCEDURE — 99213 OFFICE O/P EST LOW 20 MIN: CPT | Performed by: FAMILY MEDICINE

## 2020-06-30 RX ORDER — CYCLOBENZAPRINE HCL 5 MG
5 TABLET ORAL 3 TIMES DAILY PRN
Qty: 20 TABLET | Refills: 0 | Status: SHIPPED | OUTPATIENT
Start: 2020-06-30 | End: 2024-08-23

## 2020-06-30 RX ORDER — PREDNISONE 20 MG/1
TABLET ORAL
Qty: 20 TABLET | Refills: 0 | Status: SHIPPED | OUTPATIENT
Start: 2020-06-30 | End: 2024-08-23

## 2020-06-30 RX ORDER — BUDESONIDE AND FORMOTEROL FUMARATE DIHYDRATE 160; 4.5 UG/1; UG/1
2 AEROSOL RESPIRATORY (INHALATION) 2 TIMES DAILY
Qty: 3 INHALER | Refills: 3 | Status: SHIPPED | OUTPATIENT
Start: 2020-06-30

## 2020-06-30 ASSESSMENT — MIFFLIN-ST. JEOR: SCORE: 1681.82

## 2020-06-30 NOTE — LETTER
[address] [id number] [phone]       June 30, 2020    Alize Barrett  [address] [facility] [address]    To Whom it May Concern:    The above patient is unable to attend work for 6/30/2020- 7/6/2020  due to a medical issue. Please contact me with questions or concerns.      Sincerely,          Ten Peña M.D.

## 2020-06-30 NOTE — PROGRESS NOTES
"Subjective   Alize Barrett is a 39 year old female who presents to clinic today for the following health issues:    HPI   Back Pain    Onset: 10 days ago    Description:   Location: right low back   Character: Sharp 10/10    Intensity: moderate, severe    Progression of Symptoms: worse    Accompanying Signs & Symptoms:  Other symptoms: radiation of pain down the right leg to the thigh and mainly the upper the  Right buttock.    History:   Previous similar pain: milder symptoms ~ 5 months     Precipitating factors:   Trauma or overuse: YES- may have hurt it getting the boat on the trailer    Alleviating factors:  Improved by: heat helps get the pain to a 8/10 and chiropractor    Therapies Tried and outcome: 800 ibuprofen seems to help- stretching; aleve and tylenol did not help)     Discuss sleep issues- would like something to help sleep - tried melatonin did not help    Reviewed and updated as needed this visit by provider:  Tobacco  Allergies  Meds  Problems  Med Hx  Surg Hx  Fam Hx         Review of Systems   Constitutional, HEENT, cardiovascular, pulmonary, GI, , musculoskeletal, neuro, skin, endocrine and psych systems are negative, except as otherwise noted.           Objective   /80   Pulse 110   Temp 97.5  F (36.4  C) (Tympanic)   Ht 1.708 m (5' 7.25\")   Wt 97.5 kg (215 lb)   SpO2 100%   BMI 33.42 kg/m   Body mass index is 33.42 kg/m .  Physical Exam   GENERAL: healthy, alert, well nourished, well hydrated, no distress  HENT: ear canals- normal; TMs- normal; Nose- normal; Mouth- no ulcers, no lesions  NECK: no tenderness, no adenopathy, no asymmetry, no masses, no stiffness; thyroid- normal to palpation  RESP: lungs clear to auscultation - no rales, no rhonchi, no wheezes  CV: regular rates and rhythm, normal S1 S2, no S3 or S4 and no murmur, no click or rub -  ABDOMEN: soft, no tenderness, no  hepatosplenomegaly, no masses, normal bowel sounds  MS: extremities- no gross deformities " "noted, no edema  SKIN: no suspicious lesions, no rashes  NEURO: strength and tone- normal, sensory exam- grossly normal, mentation- intact, speech- normal, reflexes- symmetric  BACK: no CVA tenderness, right paralumbar tenderness, negative straight leg raising   LYMPHATICS: ant. cervical- normal, post. cervical- normal, axillary- normal, supraclavicular- normal, inguinal- normal          Assessment & Plan   Acute right-sided low back pain with right-sided sciatica  Bothersome symptoms and some radiation, will try anti-inflammatory course and muscle relaxer which should also help with her insomnia related to pain  - cyclobenzaprine (FLEXERIL) 5 MG tablet  Dispense: 20 tablet; Refill: 0  - predniSONE (DELTASONE) 20 MG tablet  Dispense: 20 tablet; Refill: 0    Mild intermittent asthma with acute exacerbation  Controlled - continue medication.   ACT Total Scores 6/30/2020   ACT TOTAL SCORE (Goal Greater than or Equal to 20) 21   In the past 12 months, how many times did you visit the emergency room for your asthma without being admitted to the hospital? 0   In the past 12 months, how many times were you hospitalized overnight because of your asthma? 0     - budesonide-formoterol (SYMBICORT) 160-4.5 MCG/ACT Inhaler  Dispense: 3 Inhaler; Refill: 3         BMI:   Estimated body mass index is 32.96 kg/m  as calculated from the following:    Height as of 1/30/20: 1.708 m (5' 7.25\").    Weight as of 1/30/20: 96.2 kg (212 lb).   Weight management plan: Discussed healthy diet and exercise guidelines        See Patient Instructions    Return in about 3 weeks (around 7/21/2020) for symptoms failing to improve or sooner if worsening.          Ten Peña MD      [address] [address]  [email or web address]   Office: [phone]  Pager: [phone]         "

## 2020-06-30 NOTE — TELEPHONE ENCOUNTER
"Past 1 week has right lower back pain just below her waist/getting worse/ is a \"10/10\" she can bring it down to an \"8/10' with 800 mg of ibuprofin/ right foot drags a little when she gets up in the morning but she is able to walk on it/questionable injury/  Some radiation into the thigh/ requests to be seen as vs a virtual visit/ is aware of a urgent care visit and but  wants to try clinic option first/ sent to scheduling but she is aware of an urgent care very close to her home  Fadi Read RN -[phone]    Additional Information    Negative: Passed out (i.e., fainted, collapsed and was not responding)    Negative: Shock suspected (e.g., cold/pale/clammy skin, too weak to stand, low BP, rapid pulse)    Negative: Sounds like a life-threatening emergency to the triager    Negative: Major injury to the back (e.g., MVA, fall > 10 feet or 3 meters, penetrating injury, etc.)    Negative: Pain in the upper back over the ribs (rib cage) that radiates (travels) into the chest    Negative: Pain in the upper back over the ribs (rib cage) and worsened by coughing (or clearly increases with breathing)    Negative: SEVERE back pain of sudden onset and age > 60    Negative: SEVERE abdominal pain (e.g., excruciating)    Negative: Abdominal pain and age > 60    Negative: Unable to urinate (or only a few drops) and bladder feels very full    Negative: Loss of bladder or bowel control (urine or bowel incontinence; wetting self, leaking stool) of new onset    Negative: Numbness (loss of sensation) in groin or rectal area    Negative: Pain radiates into groin, scrotum    Negative: Blood in urine (red, pink, or tea-colored)    Negative: Vomiting and pain over lower ribs of back (i.e., flank - kidney area)    Negative: Weakness of a leg or foot (e.g., unable to bear weight, dragging foot)    Negative: Patient sounds very sick or weak to the triager    Negative: Fever > 100.5 F (38.1 C) and flank pain    Negative: Pain or burning " with passing urine (urination)    SEVERE back pain (e.g., excruciating, unable to do any normal activities) and not improved after pain medicine and CARE ADVICE    Protocols used: BACK PAIN-A-OH

## 2020-07-01 ASSESSMENT — ASTHMA QUESTIONNAIRES: ACT_TOTALSCORE: 21

## 2020-07-08 ENCOUNTER — OFFICE VISIT (OUTPATIENT)
Dept: FAMILY MEDICINE | Facility: CLINIC | Age: 40
End: 2020-07-08
Payer: COMMERCIAL

## 2020-07-08 VITALS
DIASTOLIC BLOOD PRESSURE: 86 MMHG | HEART RATE: 92 BPM | BODY MASS INDEX: 33.89 KG/M2 | OXYGEN SATURATION: 99 % | TEMPERATURE: 97.1 F | SYSTOLIC BLOOD PRESSURE: 126 MMHG | WEIGHT: 218 LBS

## 2020-07-08 DIAGNOSIS — Z13.220 SCREENING FOR HYPERLIPIDEMIA: ICD-10-CM

## 2020-07-08 DIAGNOSIS — R76.8 ANTI-TPO ANTIBODIES PRESENT: ICD-10-CM

## 2020-07-08 DIAGNOSIS — Z13.1 SCREENING FOR DIABETES MELLITUS: ICD-10-CM

## 2020-07-08 DIAGNOSIS — D50.0 IRON DEFICIENCY ANEMIA DUE TO CHRONIC BLOOD LOSS: ICD-10-CM

## 2020-07-08 DIAGNOSIS — Z13.21 ENCOUNTER FOR VITAMIN DEFICIENCY SCREENING: ICD-10-CM

## 2020-07-08 DIAGNOSIS — M54.41 ACUTE RIGHT-SIDED LOW BACK PAIN WITH RIGHT-SIDED SCIATICA: ICD-10-CM

## 2020-07-08 DIAGNOSIS — F10.20 ALCOHOL USE DISORDER, MODERATE, IN CONTROLLED ENVIRONMENT (H): ICD-10-CM

## 2020-07-08 DIAGNOSIS — Z00.00 ROUTINE GENERAL MEDICAL EXAMINATION AT A HEALTH CARE FACILITY: Primary | ICD-10-CM

## 2020-07-08 DIAGNOSIS — I10 ESSENTIAL HYPERTENSION: ICD-10-CM

## 2020-07-08 LAB
ERYTHROCYTE [DISTWIDTH] IN BLOOD BY AUTOMATED COUNT: 16.8 % (ref 10–15)
HCT VFR BLD AUTO: 36.2 % (ref 35–47)
HGB BLD-MCNC: 11.5 G/DL (ref 11.7–15.7)
MCH RBC QN AUTO: 26.7 PG (ref 26.5–33)
MCHC RBC AUTO-ENTMCNC: 31.8 G/DL (ref 31.5–36.5)
MCV RBC AUTO: 84 FL (ref 78–100)
PLATELET # BLD AUTO: 244 10E9/L (ref 150–450)
RBC # BLD AUTO: 4.31 10E12/L (ref 3.8–5.2)
VIT B12 SERPL-MCNC: 930 PG/ML (ref 193–986)
WBC # BLD AUTO: 8.2 10E9/L (ref 4–11)

## 2020-07-08 PROCEDURE — 80053 COMPREHEN METABOLIC PANEL: CPT | Performed by: FAMILY MEDICINE

## 2020-07-08 PROCEDURE — 80061 LIPID PANEL: CPT | Performed by: FAMILY MEDICINE

## 2020-07-08 PROCEDURE — 82306 VITAMIN D 25 HYDROXY: CPT | Performed by: FAMILY MEDICINE

## 2020-07-08 PROCEDURE — 99396 PREV VISIT EST AGE 40-64: CPT | Performed by: FAMILY MEDICINE

## 2020-07-08 PROCEDURE — 82043 UR ALBUMIN QUANTITATIVE: CPT | Performed by: FAMILY MEDICINE

## 2020-07-08 PROCEDURE — 82607 VITAMIN B-12: CPT | Performed by: FAMILY MEDICINE

## 2020-07-08 PROCEDURE — 84443 ASSAY THYROID STIM HORMONE: CPT | Performed by: FAMILY MEDICINE

## 2020-07-08 PROCEDURE — 36415 COLL VENOUS BLD VENIPUNCTURE: CPT | Performed by: FAMILY MEDICINE

## 2020-07-08 PROCEDURE — 85027 COMPLETE CBC AUTOMATED: CPT | Performed by: FAMILY MEDICINE

## 2020-07-08 PROCEDURE — 82728 ASSAY OF FERRITIN: CPT | Performed by: FAMILY MEDICINE

## 2020-07-08 NOTE — PROGRESS NOTES
SUBJECTIVE:   CC: Alize Barrett is an 39 year old woman who presents for preventive health visit.     Healthy Habits:    Do you get at least three servings of calcium containing foods daily (dairy, green leafy vegetables, etc.)? yes    Amount of exercise or daily activities, outside of work: 3 day(s) per week    Problems taking medications regularly No    Medication side effects: No    Have you had an eye exam in the past two years? yes    Do you see a dentist twice per year? yes    Do you have sleep apnea, excessive snoring or daytime drowsiness?no      Back pain:   She was seen by Dr. Peña for back pain. She did get some improvement with prednisone but still painful. Has been going on for almost 3 weeks.    Alcohol use disorder:  More than 7 drinks per week, not daily drinking.  Tried naltrexone but it gave her nightmares. Gabapentin was somewhat helpful.      Today's PHQ-2 Score:   PHQ-2 (  [employer]) 2020   Q1: Little interest or pleasure in doing things 0 0   Q2: Feeling down, depressed or hopeless 0 0   PHQ-2 Score 0 0       Abuse: Current or Past(Physical, Sexual or Emotional)- No  Do you feel safe in your environment? Yes      Social History     Tobacco Use     Smoking status: Former Smoker     Packs/day: 0.50     Years: 17.00     Pack years: 8.50     Types: Cigarettes     Last attempt to quit: 3/19/2012     Years since quittin.3     Smokeless tobacco: Never Used   Substance Use Topics     Alcohol use: Yes     Frequency: 4 or more times a week     Drinks per session: 3 or 4     Binge frequency: Weekly     Comment: 12 ounces of liquor per day     If you drink alcohol do you typically have >3 drinks per day or >7 drinks per week? Yes - AUDIT SCORE:                          Reviewed orders with patient.  Reviewed health maintenance and updated orders accordingly - Yes  Lab work is in process    Mammogram not appropriate for this patient based on age.    Pertinent mammograms are  reviewed under the imaging tab.  History of abnormal Pap smear:   Last 3 Pap and HPV Results:   PAP / HPV Latest Ref Rng & Units 3/19/2019   PAP - NIL   HPV 16 DNA NEG:Negative Negative   HPV 18 DNA NEG:Negative Negative   OTHER HR HPV NEG:Negative Negative     PAP / HPV Latest Ref Rng & Units 3/19/2019   PAP - NIL   HPV 16 DNA NEG:Negative Negative   HPV 18 DNA NEG:Negative Negative   OTHER HR HPV NEG:Negative Negative     Reviewed and updated as needed this visit by clinical staff  Tobacco  Allergies  Meds         Reviewed and updated as needed this visit by Provider        Past Medical History:   Diagnosis Date     Allergy, unspecified not elsewhere classified     seasonal     Asthma 2012      Past Surgical History:   Procedure Laterality Date     LAPAROSCOPIC TUBAL LIGATION Bilateral 2003     retinal surgery - had detached retina         ROS:  CONSTITUTIONAL: NEGATIVE for fever, chills, change in weight  INTEGUMENTARU/SKIN: NEGATIVE for worrisome rashes, moles or lesions  EYES: NEGATIVE for vision changes or irritation  ENT: NEGATIVE for ear, mouth and throat problems  RESP: NEGATIVE for significant cough or SOB  BREAST: NEGATIVE for masses, tenderness or discharge  CV: NEGATIVE for chest pain, palpitations or peripheral edema  GI: NEGATIVE for nausea, abdominal pain, heartburn, or change in bowel habits  : NEGATIVE for unusual urinary or vaginal symptoms. Periods are regular.  MUSCULOSKELETAL: NEGATIVE for significant arthralgias or myalgia  NEURO: NEGATIVE for weakness, dizziness or paresthesias  PSYCHIATRIC: NEGATIVE for changes in mood or affect    OBJECTIVE:   /86   Pulse 92   Temp 97.1  F (36.2  C) (Tympanic)   Wt 98.9 kg (218 lb)   SpO2 99%   BMI 33.89 kg/m    EXAM:  GENERAL: healthy, alert and no distress  EYES: Eyes grossly normal to inspection, PERRL and conjunctivae and sclerae normal  HENT: ear canals and TM's normal, nose and mouth without ulcers or lesions  NECK: no adenopathy, no  asymmetry, masses, or scars and thyroid normal to palpation  RESP: lungs clear to auscultation - no rales, rhonchi or wheezes  CV: regular rate and rhythm, normal S1 S2, no S3 or S4, no murmur, click or rub, no peripheral edema and peripheral pulses strong  ABDOMEN: soft, nontender, no hepatosplenomegaly, no masses and bowel sounds normal  MS: no gross musculoskeletal defects noted, no edema  SKIN: no suspicious lesions or rashes  NEURO: Normal strength and tone, mentation intact and speech normal  Comprehensive back pain exam:  No tenderness, Range of motion not limited by pain, Lower extremity strength functional and equal on both sides, Lower extremity reflexes within normal limits bilaterally, Lower extremity sensation normal and equal on both sides and Straight leg raise negative bilaterally    Diagnostic Test Results:  none     ASSESSMENT/PLAN:   1. Routine general medical examination at a health care facility: health maintenance reviewed and updated.    2. Alcohol use disorder, moderate, in controlled environment (H): still drinking alcohol but has significant;y cut back.    3. Iron deficiency anemia due to chronic blood loss: recheck blood counts  - Ferritin  - CBC with platelets    4. Screening for diabetes mellitus: checking fasting blood sugar    5. Screening for hyperlipidemia  - Lipid panel reflex to direct LDL Fasting    6. Essential hypertension: well controlled. Continue hydrochlorothiazide 12.5 mg daily.  - Comprehensive metabolic panel (BMP + Alb, Alk Phos, ALT, AST, Total. Bili, TP)  - Albumin Random Urine Quantitative with Creat Ratio    7. Anti-TPO antibodies present: recheck thyroid labs  - TSH with free T4 reflex    8. Encounter for vitamin deficiency screening  - Vitamin D Deficiency  - Vitamin B12    9. Acute right-sided low back pain with right-sided sciatica: okay to use Tylenol #3 sparingly for back pain. Will refer to physical therapy for further treatment. IF not improving after 4-6  "months, will refer to orthopedics or consider advanced imaging.  - acetaminophen-codeine (TYLENOL #3) 300-30 MG tablet; Take 1 tablet by mouth every 6 hours as needed for severe pain  Dispense: 10 tablet; Refill: 0  - [facility]; Future        COUNSELING:   Reviewed preventive health counseling, as reflected in patient instructions       Regular exercise       Healthy diet/nutrition    Estimated body mass index is 33.89 kg/m  as calculated from the following:    Height as of 6/30/20: 1.708 m (5' 7.25\").    Weight as of this encounter: 98.9 kg (218 lb).       reports that she quit smoking about 8 years ago. Her smoking use included cigarettes. She has a 8.50 pack-year smoking history. She has never used smokeless tobacco.      Counseling Resources:  ATP IV Guidelines  Pooled Cohorts Equation Calculator  Breast Cancer Risk Calculator  FRAX Risk Assessment  ICSI Preventive Guidelines  Dietary Guidelines for Americans, 2010  USDA's MyPlate  ASA Prophylaxis  Lung CA Screening    Jasper Lomax, DO  [facility]  "

## 2020-07-09 LAB
ALBUMIN SERPL-MCNC: 3.5 G/DL (ref 3.4–5)
ALP SERPL-CCNC: 37 U/L (ref 40–150)
ALT SERPL W P-5'-P-CCNC: 35 U/L (ref 0–50)
ANION GAP SERPL CALCULATED.3IONS-SCNC: 8 MMOL/L (ref 3–14)
AST SERPL W P-5'-P-CCNC: 13 U/L (ref 0–45)
BILIRUB SERPL-MCNC: 0.3 MG/DL (ref 0.2–1.3)
BUN SERPL-MCNC: 16 MG/DL (ref 7–30)
CALCIUM SERPL-MCNC: 8.6 MG/DL (ref 8.5–10.1)
CHLORIDE SERPL-SCNC: 103 MMOL/L (ref 94–109)
CHOLEST SERPL-MCNC: 198 MG/DL
CO2 SERPL-SCNC: 26 MMOL/L (ref 20–32)
CREAT SERPL-MCNC: 0.86 MG/DL (ref 0.52–1.04)
CREAT UR-MCNC: 157 MG/DL
DEPRECATED CALCIDIOL+CALCIFEROL SERPL-MC: 44 UG/L (ref 20–75)
FERRITIN SERPL-MCNC: 13 NG/ML (ref 12–150)
GFR SERPL CREATININE-BSD FRML MDRD: 84 ML/MIN/{1.73_M2}
GLUCOSE SERPL-MCNC: 110 MG/DL (ref 70–99)
HDLC SERPL-MCNC: 74 MG/DL
LDLC SERPL CALC-MCNC: 94 MG/DL
MICROALBUMIN UR-MCNC: 12 MG/L
MICROALBUMIN/CREAT UR: 7.77 MG/G CR (ref 0–25)
NONHDLC SERPL-MCNC: 124 MG/DL
POTASSIUM SERPL-SCNC: 4.1 MMOL/L (ref 3.4–5.3)
PROT SERPL-MCNC: 7.3 G/DL (ref 6.8–8.8)
SODIUM SERPL-SCNC: 137 MMOL/L (ref 133–144)
TRIGL SERPL-MCNC: 149 MG/DL
TSH SERPL DL<=0.005 MIU/L-ACNC: 3.79 MU/L (ref 0.4–4)

## 2020-08-11 ENCOUNTER — NURSE TRIAGE (OUTPATIENT)
Dept: NURSING | Facility: CLINIC | Age: 40
End: 2020-08-11

## 2020-08-11 ENCOUNTER — TELEPHONE (OUTPATIENT)
Dept: ADDICTION MEDICINE | Facility: CLINIC | Age: 40
End: 2020-08-11

## 2020-08-11 DIAGNOSIS — F10.20 ALCOHOL USE DISORDER, MODERATE, IN CONTROLLED ENVIRONMENT (H): Primary | ICD-10-CM

## 2020-08-11 NOTE — TELEPHONE ENCOUNTER
Please review referral. Please route back to writer.     Thank you,    Julianna Pelaez    [facility]

## 2020-08-11 NOTE — TELEPHONE ENCOUNTER
Pt calling    Pt wants referral to [facility] addiction clinic,      Drinking 16 oz vodka everyday  Sx of withdrawal  HTN, pt has not taken BP since last visit, is compliant with medication  +Increased anxiety  No tremors   No sweats   No diahrea    Would like message to PCP for referral ASAP.  Rupinder Arana RN  [facility]      Additional Information    Negative: Coma (e.g., not moving, not talking, not responding to stimuli)    Negative: Difficult to awaken or acting confused (e.g., disoriented, slurred speech)    Negative: Seeing, hearing, or feeling things that are not there (i.e., visual, auditory, or tactile hallucinations)    Negative: Slow, shallow and weak breathing    Negative: Seizure    Negative: Violent behavior, or threatening to physically hurt or kill someone    Negative: Patient attempted suicide    Negative: Threatening suicide    Negative: Sounds like a life-threatening emergency to the triager    Negative: SEVERE abdominal pain (e.g., excruciating)    Negative: Constant abdominal pain lasting > 2 hours    Negative: Bloody, black, or tarry bowel movements    Negative: Vomiting red blood or black (coffee ground) material (Exception: few red streaks in vomit that only happened once)    Negative: Multiple episodes of vomiting and lasting more than 2 hours    Negative: Feeling very shaky (i.e., visible tremors of hands)    Negative: Patient sounds very sick or weak to the triager    Negative: White of the eyes have turned yellow (i.e., jaundice)    Negative: Fever > 101 F (38.3 C)    Negative: Drinks alcohol daily and prior alcohol withdrawal seizures    Negative: Drinks alcohol daily and prior DTs (delirium tremens)    Negative: Patient wants to be seen    Negative: Pregnant and intoxicated or admits to drinking problem    Requesting to talk with a counselor (mental health worker, psychiatrist, etc.)    Negative: Requesting admission for alcohol abuse    Negative: Alcohol or drug  abuse, known or suspected    Protocols used: ALCOHOL ABUSE AND [id number]

## 2020-08-11 NOTE — TELEPHONE ENCOUNTER
Called pt and let her know referral was placed.  Routing to RN to close encounter.  Imelda Smith

## 2020-08-12 ENCOUNTER — HOSPITAL ENCOUNTER (OUTPATIENT)
Dept: BEHAVIORAL HEALTH | Facility: CLINIC | Age: 40
Discharge: HOME | End: 2020-08-12
Attending: PSYCHIATRY & NEUROLOGY | Admitting: PSYCHIATRY & NEUROLOGY
Payer: COMMERCIAL

## 2020-08-12 VITALS — BODY MASS INDEX: 33.74 KG/M2 | WEIGHT: 215 LBS | HEIGHT: 67 IN

## 2020-08-12 DIAGNOSIS — F10.20 ALCOHOL USE DISORDER, SEVERE, DEPENDENCE (H): ICD-10-CM

## 2020-08-12 PROCEDURE — H0001 ALCOHOL AND/OR DRUG ASSESS: HCPCS | Mod: HF,95

## 2020-08-12 ASSESSMENT — PAIN SCALES - GENERAL: PAINLEVEL: MILD PAIN (2)

## 2020-08-12 ASSESSMENT — ANXIETY QUESTIONNAIRES
5. BEING SO RESTLESS THAT IT IS HARD TO SIT STILL: SEVERAL DAYS
GAD7 TOTAL SCORE: 10
3. WORRYING TOO MUCH ABOUT DIFFERENT THINGS: MORE THAN HALF THE DAYS
2. NOT BEING ABLE TO STOP OR CONTROL WORRYING: MORE THAN HALF THE DAYS
4. TROUBLE RELAXING: MORE THAN HALF THE DAYS
6. BECOMING EASILY ANNOYED OR IRRITABLE: NOT AT ALL
1. FEELING NERVOUS, ANXIOUS, OR ON EDGE: SEVERAL DAYS
7. FEELING AFRAID AS IF SOMETHING AWFUL MIGHT HAPPEN: MORE THAN HALF THE DAYS

## 2020-08-12 ASSESSMENT — MIFFLIN-ST. JEOR: SCORE: 1677.86

## 2020-08-12 ASSESSMENT — PATIENT HEALTH QUESTIONNAIRE - PHQ9: SUM OF ALL RESPONSES TO PHQ QUESTIONS 1-9: 6

## 2020-08-12 NOTE — TELEPHONE ENCOUNTER
"Please schedule appointment for patient with   Addiction Medicine Provider   For alcohol use disorder     Our staff will relay the following information: Please offer our patient to call [place]'s assessment line - [phone]. They can ask for a \"chemical assessment\" or \"rule 25\". This will allow them to discuss possible psychosocial treatment options including individual therapy or any group options including outpatient, intensive outpatient, or residential (inpatient) treatment.     Note routed to PCP - Please feel free to reach out to myself with any specific patient care needs as well. I can help address concerns and/or relay the information to the provider who will be scheduled. No need to reply if consult is sufficient.     Braden Mo MD    "

## 2020-08-12 NOTE — TELEPHONE ENCOUNTER
8/12/2020    [email or web address]    Dear Alize,     It was a pleasure meeting with you today on August 12, 2020 for your Chemical Dependency Evaluation.      Based on your evaluation the recommendations are as follows:    1)  Consider a medically assisted detox from alcohol.  2) Complete a residential based or similar treatment program, such as [facility], [place], [facility] or [facility].  This  understands that you prefer to start with Intensive Outpatient and you will be provided with resources for both residential and IOP.  Should you enter an outpatient program and cannot remain sober, consider inpatient.   3)  Abstain from all mood-altering chemicals unless prescribed by a licensed provider.   4)  Attend, at minimum, 2 weekly support group meetings, such as 12 step based (AA/NA), SMART Recovery and/or Health Realizations meetings.     5)  Actively work with a female mentor/sponsor on a weekly basis.   6)  Follow all the recommendations of your treatment/medical providers.  7)  Consider anti-craving medication.  8)  Participate in individual psychotherapy appointments to address panic attacks and depression and a possible referral to a psychiatrist who can prescribe medication if needed.      I will send some treatment resources via e-mail.    You can also call [facility] at [phone] or [facility] at [phone]  to arrange to see a therapist.         Should you decide on an Intensive Outpatient or a Residential program and would like me to FAX your evaluation to them, please let me know via e-mail.        If I can be of further service, please don't hesitate to e-mail me or leave a message at my office.     Sincerely wishing you the very best,         Shani Haley, JERI, LADC,CI  CD Evaluation Counselor  [facility]  [address] [place] [address]  [address]  [address]  [email or web address]   [email or web address]  Tel: [phone]  Fax: [phone]  Gender pronouns: she/hers  Employed by: [employer]    (securely e-mailed to the pt on 8/12//2020)

## 2020-08-12 NOTE — PROGRESS NOTES
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 Have you been treated for alcohol/other drug abuse/dependence? No    4. Support group participation: Have you/do you attend support group meetings to reduce/stop your alcohol/drug use? How recently? What was your experience? Are you willing to restart? If the person has not participated, is he or she willing?     The pt has never attended.    5. What would assist you in staying sober/straight?     \"Therapy and medication.\"    Dimension V Ratings   Relapse/Continued Use/Continued problem potential - The placing authority must use the criteria in Dimension V to determine a client s relapse, continued use, and continued problem potential.   RISK DESCRIPTIONS - Severity ratin No awareness of the negative impact of mental health problems or substance abuse. No coping skills to arrest mental health or addiction illnesses, or prevent relapse.    REASONS SEVERITY WAS ASSIGNED - (What information did the person provide that indicates his or her understanding of relapse issues? What about the person s experience indicates how prone he or she is to relapse? What coping skills does the person have that decrease relapse potential?)      Pt denies having ever attended a cd treatment program, a detox program, or a 12 step meeting before. She is drinking 16 oz of Vodka daily.  " "Pt lacks education into her disease of addiction. In the past 30 days, pt rates her cravings for alcohol as a 5 on the 0-10 Craving Scale. Pt identified *her relapse triggers as fear of withdrawal.  Her  also drinks with her.  Pt lacks impulse control and long-term sober maintenance skills.  Pt is at a high risk for relapse/continued use.  Patient has untreated co-occurring mental health and substance use issues, which places her at higher risk for continued use.       DIMENSION VI - Recovery Environment   1. Are you employed/attending school? Tell me about that.     The pt is employed part time as a respiratory therapist.    2A. Describe a typical day; evening for you. Work, school, social, leisure, volunteer, spiritual practices. Include time spent obtaining, using, recovering from drugs or alcohol. (DSM)     \"I am currently off for several days.  I get up and get dressed.  I check my phone to make sure I did not make any drunk calls or post anything crazy on Facebook.  I may go to the [employer].  If I stay home, I clean up, watch Jeopardy, get dinner ready, I listen to the news and about 4;30 I pour my first drink and drink throughout the evening and then pass out.'    Please describe what leisure activities have been associated with your substance abuse:     \"Everything I do can involve alcohol.\"    2B. How often do you spend more time than you planned using or use more than you planned? (DSM)     \"daily.\"    3. How important is using to your social connections? Do many of your family or friends use?     \"It is not important at all.'    4A. Are you currently in a significant relationship?     Yes.  4B. How long? 23 years.            Please describe your significant other's use of mood altering chemicals? \"My  drinks with me, but I don't think he has a problem.\"    4C. Sexual Orientation:     Heterosexual    5A. Who do you live with?       \"I live with my  and my daughter.  We live in a " "house.\"    5B. Tell me about their alcohol/drug use and mental health issues.     My  drinks with me, but I don't think he has the same problem as I do.\"    5C. Are you concerned for your safety there? No    5D. Are you concerned about the safety of anyone else who lives with you? No    6A. Do you have children who live with you?     Yes.  (Ask follow-up questions to determine the person's relationship and responsibility, both legal and care giving; and what arrangements are made for supervision for the children when the person is not available.) 'My 17-yr-old daughter.'    6B. Do you have children who do not live with you?     Yes.  (Ask follow-up questions to determine the person's relationship and responsibility, both legal and care giving; and what arrangements are made for supervision for the children when the person is not available.) the pt reports she also has a 19-yr-old son and a 22-yr-old daughter that live independently.    7A. Who supports you in making changes in your alcohol or drug use? What are they willing to do to support you? Who is upset or angry about you making changes in your alcohol or drug use? How big a problem is this for you?      \"My family and my Torri.\"    7B. This table is provided to record information about the person s relationships and available support It is not necessary to ask each item; only to get a comprehensive picture of their support system.  How often can you count on the following people when you need someone?   Partner / Spouse Always supportive   Parent(s)/Aunt(s)/Uncle(s)/Grandparents Always supportive   Sibling(s)/Cousin(s) Usually supportive   Child(mickey) Always supportive   Other relative(s) Usually supportive   Friend(s)/neighbor(s) No contact.   Child(mickey) s father(s)/mother(s) Always supportive   Support group member(s) The patient denied having any current involvement with 12-step or other support group meetings.   Community of torri members Always " "supportive   /counselor/therapist/healer The patient denied having any current involvement with a , counselor, therapist or healer.   Other (specify) No     8A. What is your current living situation?      \"I live with my  and my daughter.  We live in a house. I have no concerns about safety. My  does drink with me.\"    8B. What is your long term plan for where you will be living?     \"I  love my home, but after my daughter leaves we will eventually move to [place].'    8C. Tell me about your living environment/neighborhood? Ask enough follow up questions to determine safety, criminal activity, availability of alcohol and drugs, supportive or antagonistic to the person making changes.      The pt has no concerns.    9. Criminal justice history: Gather current/recent history and any significant history related to substance use--Arrests? Convictions? Circumstances? Alcohol or drug involvement? Sentences? Still on probation or parole? Expectations of the court? Current court order? Any sex offenses - lifetime? What level? (DSM)    None    10. What obstacles exist to participating in treatment? (Time off work, childcare, funding, transportation, pending penitentiary time, living situation)     \"I am a very private person.  I work at [employer].  I don't want to do anything overnight.\"    Dimension VI Ratings   Recovery environment - The placing authority must use the criteria in Dimension VI to determine a client s recovery environment.   RISK DESCRIPTIONS - Severity ratin Client is engaged in structured, meaningful activity, but peers, family, significant other, and living environment are unsupportive, or there is criminal justice involvement by the client or among the client's peers, significant others, or in the client's living environment.    REASONS SEVERITY WAS ASSIGNED - (What support does the person have for making changes? What structure/stability does the person have " "in his or her daily life that will increase the likelihood that changes can be sustained? What problems exist in the person s environment that will jeopardize getting/staying clean and sober?)     Family/friends/relationships; \"The family and I are good.  I am embarrassed that I drink especially with my kids seeing that.  My  and I keep to ourselves.  I don't have the energy to pursue friendships.\"Legal; \"none.\"Financial; \"We are doing well.\"  Educational; \"I have a college degree.  I am interested in going back to school.  I am big on research.\"Recreational/ leisure; \"I watch Jeopardy.  My garden looks awful, but I used to garden.  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  We live in a house.\"  The pt lacks a sober support network.  Her  does drink with her.       Client Choice/Exceptions   Would you like services specific to language, age, gender, culture, Amish preference, race, ethnicity, sexual orientation or disability?  Yes - [facility].    What particular treatment choices and options would you like to have? None    Do you have a preference for a particular treatment program? None    Criteria for Diagnosis     Criteria for Diagnosis  DSM-5 Criteria for Substance Use Disorder  Instructions: Determine whether the client currently meets the criteria for Substance Use Disorder using the diagnostic criteria in the DSM-V pp.481-58. Current means during the most recent 12 months outside a facility that controls access to substances    Category of Substance Severity (ICD-10 Code / DSM 5 Code)     Alcohol Use Disorder Severe  (10.20) (303.90)   Cannabis Use Disorder The patient does not meet the criteria for a Cannabis use disorder.   Hallucinogen Use Disorder The patient does not meet the criteria for a Hallucinogen use disorder.   Inhalant Use Disorder The patient does " not meet the criteria for an Inhalant use disorder.   Opioid Use Disorder The patient does not meet the criteria for an Opioid use disorder.   Sedative, Hypnotic, or Anxiolytic Use Disorder The patient does not meet the criteria for a Sedative/Hypnotic use disorder.   Stimulant Related Disorder The patient does not meet the criteria for a Stimulant use disorder.   Tobacco Use Disorder The patient does not meet the criteria for a Tobacco use disorder.   Other (or unknown) Substance Use Disorder The patient does not meet the criteria for a Other (or unknown) Substance use disorder.       Collateral Contact Summary   Number of contacts made: one    Contact with referring person:  Yes    If court related records were reviewed, summarize here: No court records had been reviewed at the time of this documentation.    Rule 25 Assessment Summary and Plan   's Recommendation    1)  Consider a medically assisted detox from alcohol.  2) Complete a residential based or similar treatment program, such as [facility] or [facility].  This  understands that you prefer to start with Intensive Outpatient and you will be provided with resources for both residential and IOP.  Should you enter an outpatient program and cannot remain sober, consider inpatient.   3)  Abstain from all mood-altering chemicals unless prescribed by a licensed provider.   4)  Attend, at minimum, 2 weekly support group meetings, such as 12 step based (AA/NA), SMART Recovery and/or Health Realizations meetings.     5)  Actively work with a female mentor/sponsor on a weekly basis.   6)  Follow all the recommendations of your treatment/medical providers.  7)  Consider anti-craving medication.  8)  Participate in individual psychotherapy appointments to address panic attacks and depression and a possible referral to a psychiatrist who can prescribe medication if needed.          Collateral Contacts     Name:    Nestor Barrett    Relationship:     Phone Number:    [phone] Releases:    Yes     Left a VM with him requesting he call or e-mail with collateral information.      Collateral Contacts     Name    [facility]'s EMR   Relationship    NA Phone Number    NA Releases    NA     Information Provided:  This writer reviewed this patients Electronic Medical Record and the information reviewed largely supports the information the patient reported during their CD evaluation.      ollateral Cotacts      A problematic pattern of alcohol/drug use leading to clinically significant impairment or distress, as manifested by at least two of the following, occurring within a 12-month period:    1.) Alcohol/drug is often taken in larger amounts or over a longer period than was intended.  2.) There is a persistent desire or unsuccessful efforts to cut down or control alcohol/drug use  3.) A great deal of time is spent in activities necessary to obtain alcohol, use alcohol, or recover from its effects.  4.) Craving, or a strong desire or urge to use alcohol/drug  7.) Important social, occupational, or recreational activities are given up or reduced because of alcohol/drug use.  9.) Alcohol/drug use is continued despite knowledge of having a persistent or recurrent physical or psychological problem that is likely to have been caused or exacerbated by alcohol.  11.) Withdrawal, as manifested by either of the following: The characteristic withdrawal syndrome for alcohol/drug (refer to Criteria A and B of the criteria set for alcohol/drug withdrawal). and Alcohol/drug (or a closely related substance, such as a benzodiazepine) is taken to relieve or avoid withdrawal symptoms.      Specify if: In early remission:  After full criteria for alcohol/drug use disorder were previously met, none of the criteria for alcohol/drug use disorder have been met for at least 3 months but for less than 12 months (with the exception that Criterion A4,  Craving or a  strong desire or urge to use alcohol/drug  may be met).     In sustained remission:   After full criteria for alcohol use disorder were previously met, non of the criteria for alcohol/drug use disorder have been met at any time during a period of 12 months or longer (with the exception that Criterion A4,  Craving or strong desire or urge to use alcohol/drug  may be met).   Specify if:   This additional specifier is used if the individual is in an environment where access to alcohol is restricted.    Mild: Presence of 2-3 symptoms  Moderate: Presence of 4-5 symptoms  Severe: Presence of 6 or more symptoms

## 2020-08-12 NOTE — PROGRESS NOTES
8/12/2020    [email or web address]    Dear Alize,     It was a pleasure meeting with you today on August 12, 2020 for your Chemical Dependency Evaluation.      Based on your evaluation the recommendations are as follows:    1)  Consider a medically assisted detox from alcohol.  2) Complete a residential based or similar treatment program, such as [facility], [place], [facility] or [place].  This  understands that you prefer to start with Intensive Outpatient and you will be provided with resources for both residential and IOP.  Should you enter an outpatient program and cannot remain sober, consider inpatient.   3)  Abstain from all mood-altering chemicals unless prescribed by a licensed provider.   4)  Attend, at minimum, 2 weekly support group meetings, such as 12 step based (AA/NA), SMART Recovery and/or Health Realizations meetings.     5)  Actively work with a female mentor/sponsor on a weekly basis.   6)  Follow all the recommendations of your treatment/medical providers.  7)  Consider anti-craving medication.  8)  Participate in individual psychotherapy appointments to address panic attacks and depression and a possible referral to a psychiatrist who can prescribe medication if needed.      I will send some treatment resources via e-mail.    You can also call [facility] at [phone] or [facility] at [phone]  to arrange to see a therapist.         Should you decide on an Intensive Outpatient or a Residential program and would like me to FAX your evaluation to them, please let me know via e-mail.        If I can be of further service, please don't hesitate to e-mail me or leave a message at my office.     Sincerely wishing you the very best,         Shani Haley, JERI, LADC,CI  CD Evaluation Counselor  [facility]  [address] [place] [address]  [address]  [address]  [email or web address]   [email or web address]  Tel: [phone]  Fax: [phone]  Gender pronouns: she/hers  Employed by: [employer]    (securely e-mailed to the pt on 8/12//2020)

## 2020-08-12 NOTE — PROGRESS NOTES
"[facility]  [address] [place] [id number]                ADULT CD ASSESSMENT ADDENDUM      Patient Name: Alize Barrett  Cell Phone:   Home: [phone] (home)    Mobile:   Telephone Information:   Mobile [phone]       Email:  [email or web address]    Emergency Contact and collateral: Nestor Barrett () Tel: [phone]    The patient reported being:      With which race do you identify? / Black    Initial Screening Questions     1. Are you currently having severe withdrawal symptoms that are putting yourself or others in danger?  No    2. Are you currently having severe medical problems that require immediate attention?  No    3. Are you currently having severe emotional or behavioral problems that are putting yourself or others at risk of harm?  No    4. Do you have sufficient reading skills that will enable you to understand written materials, including the program rules and client rights materials?  Yes     Family History and other additional information     Who raised you? (parents, grandparents, adoptive parents, step-parents, etc.)    Mother    Please tell me what it was like growing up in your family. (please include any history of substance abuse, mental health issues, emotional/physical/sexual abuse, forms of discipline, and support)     \"It was alright.  It was just me, my Mom and my brother.  He moved out when I was 11-yrs-old.  I was teased a lot.  I used to sit and rock and I did that until I was 22-yrs-old.  I researched it in college and some traits are passed on from father to daughter.\"    Do you have any children or Stepchildren? Yes, explain: The pt reports that she has three children ages 22, 19 and 17.    Are you being investigated by Child Protection Services? No    Do you have a child protection worker, probation office or ?  No    How would you describe your current finances?  Doing well    If you are having " "problems, (unpaid bills, bankruptcy, IRS problems) please explain:  No    If working or a student are you able to function appropriately in that setting? Yes     Describe your preferred learning style:  by reading, by hands-on practice and by watching someone else demonstrate    What are your some of your personal strengths?  \"I feel I am a good communicator and I try to bring people together and being positive.  I am a curious learner.,\"    Do you currently participate in community santosh activities, such as attending Buddhism, temple, Roman Catholic or Quaker services?  Yes, explain: 'I attend a Adventist Buddhism.'    How does your spirituality impact your recovery?  'It affects it a lot.  Drinking is not going to get me into Heaven.\"    Do you currently self-administer your medications?  Yes    Have you ever had to lie to people important to you about how much you berry?   Yes, explain: \"I would keep it to myself.'   Have you ever felt the need to bet more and more money?   Yes, explain: \"That ended years ago\"   Have you ever attempted treatment for a gambling problem?   No   Have you ever touched or fondled someone else inappropriately or forced them to have sex with you against their will?   No   Are you or have you ever been a registered sex offender?   No   Is there any history of sexual abuse in your family? No   Have you ever felt obsessed by your sexual behavior, such as having sex with many partners, masturbating often, using pornography often?   No     Have you ever received therapy or stayed in the hospital for mental health problems?   Yes, explain: \"therapy.\"     Have you ever hurt yourself, such as cutting, burning or hitting yourself?   No     Have you ever purged, binged or restricted yourself as a way to control your weight?   No     Are you on a special diet?   No     Do you have any concerns regarding your nutritional status?   No     Have you had any appetite changes in the last 3 months?   No   Have " "you had weight loss or weight gain of more than 10 lbs in the last 3 months?   If patient gained or lost more than 10 lbs, then refer to program RN / attending Physician for assessment.   No   Was the patient informed of BMI?    Above,  Referral to primary care physician   Yes   Have you engaged in any risk-taking behavior that would put you at risk for exposure to blood-borne or sexually transmitted diseases?   No   Do you have any dental problems?   No   Have you ever lived through any trauma or stressful life events?   No   In the past month, have you had any of the following symptoms related to the trauma listed above? (dreams, intense memories, flashbacks, physical reactions, etc.)   No   Have you ever believed people were spying on you, or that someone was plotting against you or trying to hurt you?   No   Have you ever believed someone was reading your mind or could hear your thoughts or that you could actually read someone's mind or hear what another person was thinking?   No   Have you ever believed that someone of some force outside of yourself was putting thoughts into your mind or made you act in a way that was not your usual self?  Have you ever though you were possessed?   No   Have you ever believed you were being sent special messages through the TV, radio or newspaper?   No   Have you ever heard things other people couldn't hear, such as voices or other noises?   Yes, explain: \"Only during withdrawal.\"   Have you ever had visions when you were awake?  Or have you ever seen things other people couldn't see?   No   Do you have a valid 's license?    Yes     PHQ-9, ARMANDO-7 and Suicide Risk Assessment   PHQ-9 on 8/12/2020 ARMANDO-7 on 8/12/2020   The patient's PHQ-9 score was 6 out of 27, indicating mild depression.   The patient's ARMANDO-7 score was 10 out of 21, indicating moderate anxiety.       [place]-Suicide Severity Rating Scale   Suicide Ideation   1.) Have you ever wished you were dead or that " you could go to sleep and not wake up?     Lifetime:  No   Past Month:  No     2.) Have you actually had any thoughts of killing yourself?   Lifetime:  No   Past Month:  No     3.) Have you been thinking about how you might do this?     Lifetime:  No   Past Month:  No     4.) Have you had these thoughts and had some intention of acting on them?     Lifetime:  No   Past Month:  No     5.) Have you started to work out the details of how to kill yourself?   Lifetime:  No   Past Month:  No     6.) Do you intend to carry out this plan?      Lifetime:  No   Past Month:  No     Intensity of Ideation   Intensity of ideation (1 being least severe, 5 being most severe):     Lifetime:  The patient denied ever having any suicidal thoughts in life.   Past Month:  The patient denied ever having any suicidal thoughts in life.     How often do you have these thoughts?  The patient denied ever having any suicidal thoughts in life.     When you have the thoughts how long do they last?  The patient denied ever having any suicidal thoughts in life.     Can you stop thinking about killing yourself or wanting to die if you want to?  The patient denied ever having any suicidal thoughts in life.     Are there things - anyone or anything (i.e. family, Yazidism, pain of death) that stopped you from wanting to die or acting on thoughts of suicide?  Does not apply     What sort of reasons did you have for thinking about wanting to die or killing yourself (ie end pain, stop how you were feeling, get attention or reaction, revenge)?  Does not apply     Suicidal Behavior   (Suicide Attempt) - Have you made a suicide attempt?     Lifetime:  The patient had never made a suicide attempt.   Past Month:  The patient had never made a suicide attempt.     Have you engaged in self-harm (non-suicidal self-injury)?  The patient denied having any history of engaging in self-harm (non-suicidal self-injury).     (Interrupted Attempt) - Has there been a time  when you started to do something to end your life but someone or something stopped you before you actually did anything?  The patient denied having any history of an interrupted suicide attempt.     (Aborted or Self-Interrupted Attempt) - Has there been a time when you started to do something to try to end your life but you stopped yourself before you actually did anything?  The patient denied having any history of an aborted or self-interrupted suicide attempt.     (Preparatory Acts of Behavior) - Have you taken any steps towards making suicide attempt or preparing to kill yourself (such as collecting pills, getting a gun, giving valuables away or writing a suicide note)?  The patient denied having any history of taking any steps towards making a suicide attempt or preparing to kill self.     Actual Lethality/Medical Damage:  The patient denied ever making a suicidal attempt.       2008  [employer]  Used with permission by Abby Walls, PhD.       Guide to C-SSRS Risk Ratings   NO IDEATION:  with no active thoughts IDEATION: with a wish to die. IDEATION: with active thoughts. Risk Ratings   If Yes No No 0 - Very Low Risk   If NA Yes No 1 - Low Risk   If NA Yes Yes 2 - Low/moderate risk   IDEATION: associated thoughts of methods without intent or plan INTENT: Intent to follow through on suicide PLAN: Plan to follow through on suicide Risk Ratings cont...   If Yes No No 3 - Moderate Risk   If Yes Yes No 4 - High Risk   If Yes Yes Yes 5 - High Risk   The patient's ADDITIONAL RISK FACTORS and lack of PROTECTIVE FACTORS may increase their overall suicide risk ratings.     Additional Risk Factors:    Someone close to the patient (family member/friend) completed a suicide     Significant history of having untreated or poorly treated mental health symptoms   Protective Factors:    Having people in his/her life that would prevent the patient from considering a suicide attempt (i.e.  "young children, spouse, parents, etc.)     An absence of chronic health problems or stable and well treated chronic health issues     A positive relationship with his/her clinical medical and/or mental health providers     Having easy access to supportive family members     Having cultural, Confucianism or spiritual beliefs that discourage suicide     Risk Status   Past month: 0. - Very Low Risk:  Evaluation Counselors:  Document in Epic / SBAR to counselor \"Very Low Risk\".      Treatment Counselors:  Reassess upon admission as applicable, assess weekly in progress notes under Dimension 3 and summarize in Discharge / Treatment summary under Dimension 3.    Past 24 hours: 0. - Very Low Risk:  Evaluation Counselors:  Document in Epic / SBAR to counselor \"Very Low Risk\".      Treatment Counselors:  Reassess upon admission as applicable, assess weekly in progress notes under Dimension 3 and summarize in Discharge / Treatment summary under Dimension 3.   Additional information to support suicide risk rating: There was no additional information to provide at this time.     Mental Health Status   Physical Appearance/Attire: Comment: telephone visit, could not assess.   Hygiene: Comment:  telephone visit, could not assess.   Eye Contact: comment:  telephone visit, could not assess.   Speech Rate:  regular   Speech Volume: regular   Speech Quality: fluid   Cognitive/Perceptual:  reality based   Cognition: memory intact    Judgment: intact   Insight: intact   Orientation:  time, place, person and situation   Thought: logical    Hallucinations:  auditory outside head   General Behavioral Tone: cooperative   Psychomotor Activity:  telephone visit, could not assess.   Gait:   telephone visit, could not assess.   Mood: normal   Affect: congruence/appropriate   Counselor Notes: NA     Criteria for Diagnosis: DSM-5 Criteria for Substance Use Disorders      Alcohol Use Disorder Severe - 303.90 (F10.20)    Level of Care   I.) " Intoxication and Withdrawal: 3   II.) Biomedical:  1   III.) Emotional and Behavioral:  1   IV.) Readiness to Change:  2   V.) Relapse Potential: 4   VI.) Recovery Environmental: 2     Initial Problem List     The patient lacks relapse prevention skills  The patient has poor coping skills  The patient has poor refusal skills   The patient lacks a sober peer support network  The patient has dual issues of MI and CD    Patient/Client is willing to follow treatment recommendations.  The pt would prefer to start with an IOP rather than a residential program.  She is also interested in therapy and medication for Depression and Panic Disorder. She has been advised that a medically assisted detox is recommended prior to beginning treatment.    Counselor: JAYLEEN Mortensen

## 2020-08-13 ASSESSMENT — ANXIETY QUESTIONNAIRES: GAD7 TOTAL SCORE: 10

## 2020-08-18 NOTE — TELEPHONE ENCOUNTER
Pt is scheduled with Dr. Feliz on 08/24 @ 3pm @ [facility] Video visit. Closing encounter as no further follow up is needed.     Julianna Pelaez    [facility] [place] [facility]

## 2020-09-11 DIAGNOSIS — I10 ESSENTIAL HYPERTENSION: ICD-10-CM

## 2020-09-11 RX ORDER — HYDROCHLOROTHIAZIDE 12.5 MG/1
TABLET ORAL
Qty: 90 TABLET | Refills: 1 | Status: SHIPPED | OUTPATIENT
Start: 2020-09-11 | End: 2021-02-04

## 2020-11-29 ENCOUNTER — HEALTH MAINTENANCE LETTER (OUTPATIENT)
Age: 40
End: 2020-11-29

## 2021-02-03 DIAGNOSIS — I10 ESSENTIAL HYPERTENSION: ICD-10-CM

## 2021-02-04 RX ORDER — HYDROCHLOROTHIAZIDE 12.5 MG/1
TABLET ORAL
Qty: 90 TABLET | Refills: 1 | Status: SHIPPED | OUTPATIENT
Start: 2021-02-04

## 2021-06-20 NOTE — LETTER
Letter by Lena Castellon RN at      Author: Lena Castellon RN Service: -- Author Type: --    Filed:  Encounter Date: 5/13/2020 Status: (Other)       5/13/2020        Alize Rangel [facility] [address]    This letter provides a written record that you were tested for COVID-19 on 5/11/20.     Your result was negative.    This means that we didnt find the virus that causes COVID-19 in your sample. A test may show negative when you do actually have the virus. This can happen when the virus is in the early stages of infection, before you feel illness symptoms.    Even if you dont have symptoms, they may still appear. For safety, its very important to follow these rules.    Keep yourself away from others (self-isolation):      Stay home. Dont go to work, school or anywhere else.     Stay in your own room (and use your own bathroom), if you can.    Stay away from others in your home. No hugging, kissing or shaking hands. No visitors.    Clean high touch surfaces often (doorknobs, counters, handles, etc.). Use a household cleaning spray or wipes.    Cover your mouth and nose with a mask, tissue or washcloth to avoid spreading germs.    Wash your hands and face often with soap and water.    Stay in self-isolation until you meet ALL of the guidelines below:    1. You have had no fever for at least 72 hours (that is 3 full days of no fever without the use of medicine that reduces fevers), AND  2. other symptoms (such as cough, shortness of breath) have gotten better, AND  3. at least 10 days have passed since your symptoms first appeared.    Going back to work  Check with your employer for any guidelines to follow for going back to work.    Employers: This document serves as formal notice that your employee tested negative for COVID-19, as of the testing date shown above.    For questions regarding this letter or your Negative COVID-19 result, call [phone] between 8A to 6:30P (M-F) and 10A to  6:30P (weekends).

## 2021-09-19 ENCOUNTER — HEALTH MAINTENANCE LETTER (OUTPATIENT)
Age: 41
End: 2021-09-19

## 2022-11-21 ENCOUNTER — HEALTH MAINTENANCE LETTER (OUTPATIENT)
Age: 42
End: 2022-11-21

## 2023-02-16 ENCOUNTER — HOSPITAL ENCOUNTER (EMERGENCY)
Facility: CLINIC | Age: 43
Discharge: LEFT BEFORE DISCHARGE | End: 2023-02-16

## 2023-02-16 ASSESSMENT — ACTIVITIES OF DAILY LIVING (ADL)
ADLS_ACUITY_SCORE: 33
ADLS_ACUITY_SCORE: 33

## 2023-11-26 ENCOUNTER — HEALTH MAINTENANCE LETTER (OUTPATIENT)
Age: 43
End: 2023-11-26

## 2024-02-04 ENCOUNTER — HEALTH MAINTENANCE LETTER (OUTPATIENT)
Age: 44
End: 2024-02-04

## 2024-08-22 ENCOUNTER — PREP FOR PROCEDURE (OUTPATIENT)
Dept: OBGYN | Facility: CLINIC | Age: 44
End: 2024-08-22
Payer: COMMERCIAL

## 2024-08-22 RX ORDER — ACETAMINOPHEN 325 MG/1
975 TABLET ORAL ONCE
Status: CANCELLED | OUTPATIENT
Start: 2024-08-22 | End: 2024-08-22

## 2024-08-22 RX ORDER — METRONIDAZOLE 500 MG/100ML
500 INJECTION, SOLUTION INTRAVENOUS
Status: CANCELLED | OUTPATIENT
Start: 2024-08-22

## 2024-08-23 RX ORDER — GABAPENTIN 300 MG/1
300 CAPSULE ORAL 3 TIMES DAILY
COMMUNITY

## 2024-08-23 RX ORDER — LOSARTAN POTASSIUM 25 MG/1
1 TABLET ORAL DAILY
COMMUNITY
Start: 2022-09-10

## 2024-08-23 RX ORDER — BUPROPION HYDROCHLORIDE 150 MG/1
150 TABLET ORAL EVERY MORNING
COMMUNITY

## 2024-08-23 RX ORDER — CHLORAL HYDRATE 500 MG
2 CAPSULE ORAL DAILY
COMMUNITY

## 2024-08-23 RX ORDER — TRAZODONE HYDROCHLORIDE 50 MG/1
25-150 TABLET, FILM COATED ORAL AT BEDTIME
COMMUNITY
Start: 2023-02-20

## 2024-08-30 ENCOUNTER — ANESTHESIA (OUTPATIENT)
Dept: SURGERY | Facility: CLINIC | Age: 44
End: 2024-08-30
Payer: COMMERCIAL

## 2024-08-30 ENCOUNTER — ANESTHESIA EVENT (OUTPATIENT)
Dept: SURGERY | Facility: CLINIC | Age: 44
End: 2024-08-30
Payer: COMMERCIAL

## 2024-08-30 ENCOUNTER — HOSPITAL ENCOUNTER (OUTPATIENT)
Facility: CLINIC | Age: 44
Discharge: HOME | End: 2024-08-30
Attending: OBSTETRICS & GYNECOLOGY | Admitting: OBSTETRICS & GYNECOLOGY
Payer: COMMERCIAL

## 2024-08-30 VITALS
OXYGEN SATURATION: 100 % | TEMPERATURE: 97.1 F | DIASTOLIC BLOOD PRESSURE: 65 MMHG | SYSTOLIC BLOOD PRESSURE: 119 MMHG | HEART RATE: 69 BPM | RESPIRATION RATE: 12 BRPM | BODY MASS INDEX: 33.8 KG/M2 | WEIGHT: 223 LBS | HEIGHT: 68 IN

## 2024-08-30 DIAGNOSIS — Z90.710 H/O VAGINAL HYSTERECTOMY: Primary | ICD-10-CM

## 2024-08-30 LAB
BASOPHILS # BLD AUTO: 0 10E3/UL (ref 0–0.2)
BASOPHILS NFR BLD AUTO: 1 %
DACRYOCYTES BLD QL SMEAR: SLIGHT
EOSINOPHIL # BLD AUTO: 0.1 10E3/UL (ref 0–0.7)
EOSINOPHIL NFR BLD AUTO: 3 %
ERYTHROCYTE [DISTWIDTH] IN BLOOD BY AUTOMATED COUNT: 15.6 % (ref 10–15)
HCG SERPL QL: NEGATIVE
HCT VFR BLD AUTO: 31.7 % (ref 35–47)
HGB BLD-MCNC: 9.9 G/DL (ref 11.7–15.7)
IMM GRANULOCYTES # BLD: 0 10E3/UL
IMM GRANULOCYTES NFR BLD: 0 %
LYMPHOCYTES # BLD AUTO: 1.2 10E3/UL (ref 0.8–5.3)
LYMPHOCYTES NFR BLD AUTO: 34 %
MCH RBC QN AUTO: 26.3 PG (ref 26.5–33)
MCHC RBC AUTO-ENTMCNC: 31.2 G/DL (ref 31.5–36.5)
MCV RBC AUTO: 84 FL (ref 78–100)
MONOCYTES # BLD AUTO: 0.5 10E3/UL (ref 0–1.3)
MONOCYTES NFR BLD AUTO: 13 %
NEUTROPHILS # BLD AUTO: 1.8 10E3/UL (ref 1.6–8.3)
NEUTROPHILS NFR BLD AUTO: 49 %
NRBC # BLD AUTO: 0 10E3/UL
NRBC BLD AUTO-RTO: 0 /100
PLAT MORPH BLD: ABNORMAL
PLATELET # BLD AUTO: 241 10E3/UL (ref 150–450)
RBC # BLD AUTO: 3.77 10E6/UL (ref 3.8–5.2)
RBC MORPH BLD: ABNORMAL
WBC # BLD AUTO: 3.6 10E3/UL (ref 4–11)

## 2024-08-30 PROCEDURE — 250N000009 HC RX 250: Performed by: NURSE ANESTHETIST, CERTIFIED REGISTERED

## 2024-08-30 PROCEDURE — 250N000025 HC SEVOFLURANE, PER MIN: Performed by: OBSTETRICS & GYNECOLOGY

## 2024-08-30 PROCEDURE — 85025 COMPLETE CBC W/AUTO DIFF WBC: CPT | Performed by: OBSTETRICS & GYNECOLOGY

## 2024-08-30 PROCEDURE — 710N000009 HC RECOVERY PHASE 1, LEVEL 1, PER MIN: Performed by: OBSTETRICS & GYNECOLOGY

## 2024-08-30 PROCEDURE — 272N000001 HC OR GENERAL SUPPLY STERILE: Performed by: OBSTETRICS & GYNECOLOGY

## 2024-08-30 PROCEDURE — 88307 TISSUE EXAM BY PATHOLOGIST: CPT | Mod: TC | Performed by: OBSTETRICS & GYNECOLOGY

## 2024-08-30 PROCEDURE — 84703 CHORIONIC GONADOTROPIN ASSAY: CPT | Performed by: OBSTETRICS & GYNECOLOGY

## 2024-08-30 PROCEDURE — 999N000141 HC STATISTIC PRE-PROCEDURE NURSING ASSESSMENT: Performed by: OBSTETRICS & GYNECOLOGY

## 2024-08-30 PROCEDURE — 258N000003 HC RX IP 258 OP 636: Performed by: NURSE ANESTHETIST, CERTIFIED REGISTERED

## 2024-08-30 PROCEDURE — 360N000076 HC SURGERY LEVEL 3, PER MIN: Performed by: OBSTETRICS & GYNECOLOGY

## 2024-08-30 PROCEDURE — 250N000011 HC RX IP 250 OP 636: Performed by: OBSTETRICS & GYNECOLOGY

## 2024-08-30 PROCEDURE — 250N000011 HC RX IP 250 OP 636: Performed by: ANESTHESIOLOGY

## 2024-08-30 PROCEDURE — 710N000012 HC RECOVERY PHASE 2, PER MINUTE: Performed by: OBSTETRICS & GYNECOLOGY

## 2024-08-30 PROCEDURE — 36415 COLL VENOUS BLD VENIPUNCTURE: CPT | Performed by: OBSTETRICS & GYNECOLOGY

## 2024-08-30 PROCEDURE — 250N000013 HC RX MED GY IP 250 OP 250 PS 637: Performed by: OBSTETRICS & GYNECOLOGY

## 2024-08-30 PROCEDURE — 250N000009 HC RX 250: Performed by: OBSTETRICS & GYNECOLOGY

## 2024-08-30 PROCEDURE — 250N000011 HC RX IP 250 OP 636: Performed by: NURSE ANESTHETIST, CERTIFIED REGISTERED

## 2024-08-30 PROCEDURE — 370N000017 HC ANESTHESIA TECHNICAL FEE, PER MIN: Performed by: OBSTETRICS & GYNECOLOGY

## 2024-08-30 PROCEDURE — 88307 TISSUE EXAM BY PATHOLOGIST: CPT | Mod: 26 | Performed by: PATHOLOGY

## 2024-08-30 RX ORDER — ACETAMINOPHEN 325 MG/1
975 TABLET ORAL ONCE
Status: COMPLETED | OUTPATIENT
Start: 2024-08-30 | End: 2024-08-30

## 2024-08-30 RX ORDER — SODIUM CHLORIDE, SODIUM LACTATE, POTASSIUM CHLORIDE, CALCIUM CHLORIDE 600; 310; 30; 20 MG/100ML; MG/100ML; MG/100ML; MG/100ML
INJECTION, SOLUTION INTRAVENOUS CONTINUOUS PRN
Status: DISCONTINUED | OUTPATIENT
Start: 2024-08-30 | End: 2024-08-30

## 2024-08-30 RX ORDER — CEFAZOLIN SODIUM/WATER 2 G/20 ML
2 SYRINGE (ML) INTRAVENOUS SEE ADMIN INSTRUCTIONS
Status: DISCONTINUED | OUTPATIENT
Start: 2024-08-30 | End: 2024-08-30 | Stop reason: HOSPADM

## 2024-08-30 RX ORDER — ONDANSETRON 4 MG/1
4 TABLET, ORALLY DISINTEGRATING ORAL EVERY 8 HOURS PRN
Qty: 4 TABLET | Refills: 0 | Status: SHIPPED | OUTPATIENT
Start: 2024-08-30

## 2024-08-30 RX ORDER — HYDROMORPHONE HCL IN WATER/PF 6 MG/30 ML
0.2 PATIENT CONTROLLED ANALGESIA SYRINGE INTRAVENOUS EVERY 5 MIN PRN
Status: DISCONTINUED | OUTPATIENT
Start: 2024-08-30 | End: 2024-08-30 | Stop reason: HOSPADM

## 2024-08-30 RX ORDER — OXYCODONE HYDROCHLORIDE 5 MG/1
5-10 TABLET ORAL EVERY 4 HOURS PRN
Qty: 12 TABLET | Refills: 0 | Status: SHIPPED | OUTPATIENT
Start: 2024-08-30

## 2024-08-30 RX ORDER — ONDANSETRON 2 MG/ML
INJECTION INTRAMUSCULAR; INTRAVENOUS PRN
Status: DISCONTINUED | OUTPATIENT
Start: 2024-08-30 | End: 2024-08-30

## 2024-08-30 RX ORDER — OXYCODONE HYDROCHLORIDE 5 MG/1
5 TABLET ORAL
Status: DISCONTINUED | OUTPATIENT
Start: 2024-08-30 | End: 2024-08-30 | Stop reason: HOSPADM

## 2024-08-30 RX ORDER — LIDOCAINE HYDROCHLORIDE 20 MG/ML
INJECTION, SOLUTION INFILTRATION; PERINEURAL PRN
Status: DISCONTINUED | OUTPATIENT
Start: 2024-08-30 | End: 2024-08-30

## 2024-08-30 RX ORDER — ONDANSETRON 2 MG/ML
4 INJECTION INTRAMUSCULAR; INTRAVENOUS EVERY 30 MIN PRN
Status: DISCONTINUED | OUTPATIENT
Start: 2024-08-30 | End: 2024-08-30 | Stop reason: HOSPADM

## 2024-08-30 RX ORDER — SODIUM CHLORIDE, SODIUM LACTATE, POTASSIUM CHLORIDE, CALCIUM CHLORIDE 600; 310; 30; 20 MG/100ML; MG/100ML; MG/100ML; MG/100ML
INJECTION, SOLUTION INTRAVENOUS CONTINUOUS
Status: DISCONTINUED | OUTPATIENT
Start: 2024-08-30 | End: 2024-08-30 | Stop reason: HOSPADM

## 2024-08-30 RX ORDER — ACETAMINOPHEN 325 MG/1
975 TABLET ORAL ONCE
Status: DISCONTINUED | OUTPATIENT
Start: 2024-08-30 | End: 2024-08-30

## 2024-08-30 RX ORDER — MAGNESIUM HYDROXIDE 1200 MG/15ML
LIQUID ORAL PRN
Status: DISCONTINUED | OUTPATIENT
Start: 2024-08-30 | End: 2024-08-30 | Stop reason: HOSPADM

## 2024-08-30 RX ORDER — ONDANSETRON 4 MG/1
4 TABLET, ORALLY DISINTEGRATING ORAL EVERY 30 MIN PRN
Status: DISCONTINUED | OUTPATIENT
Start: 2024-08-30 | End: 2024-08-30 | Stop reason: HOSPADM

## 2024-08-30 RX ORDER — KETOROLAC TROMETHAMINE 30 MG/ML
INJECTION, SOLUTION INTRAMUSCULAR; INTRAVENOUS PRN
Status: DISCONTINUED | OUTPATIENT
Start: 2024-08-30 | End: 2024-08-30

## 2024-08-30 RX ORDER — CEFAZOLIN SODIUM/WATER 2 G/20 ML
2 SYRINGE (ML) INTRAVENOUS
Status: DISCONTINUED | OUTPATIENT
Start: 2024-08-30 | End: 2024-08-30

## 2024-08-30 RX ORDER — NALOXONE HYDROCHLORIDE 0.4 MG/ML
0.1 INJECTION, SOLUTION INTRAMUSCULAR; INTRAVENOUS; SUBCUTANEOUS
Status: DISCONTINUED | OUTPATIENT
Start: 2024-08-30 | End: 2024-08-30 | Stop reason: HOSPADM

## 2024-08-30 RX ORDER — FENTANYL CITRATE 50 UG/ML
25 INJECTION, SOLUTION INTRAMUSCULAR; INTRAVENOUS EVERY 5 MIN PRN
Status: DISCONTINUED | OUTPATIENT
Start: 2024-08-30 | End: 2024-08-30 | Stop reason: HOSPADM

## 2024-08-30 RX ORDER — METRONIDAZOLE 500 MG/100ML
500 INJECTION, SOLUTION INTRAVENOUS
Status: DISCONTINUED | OUTPATIENT
Start: 2024-08-30 | End: 2024-08-30 | Stop reason: HOSPADM

## 2024-08-30 RX ORDER — PROPOFOL 10 MG/ML
INJECTION, EMULSION INTRAVENOUS CONTINUOUS PRN
Status: DISCONTINUED | OUTPATIENT
Start: 2024-08-30 | End: 2024-08-30

## 2024-08-30 RX ORDER — DEXAMETHASONE SODIUM PHOSPHATE 4 MG/ML
INJECTION, SOLUTION INTRA-ARTICULAR; INTRALESIONAL; INTRAMUSCULAR; INTRAVENOUS; SOFT TISSUE PRN
Status: DISCONTINUED | OUTPATIENT
Start: 2024-08-30 | End: 2024-08-30

## 2024-08-30 RX ORDER — HYDROMORPHONE HCL IN WATER/PF 6 MG/30 ML
0.4 PATIENT CONTROLLED ANALGESIA SYRINGE INTRAVENOUS EVERY 5 MIN PRN
Status: DISCONTINUED | OUTPATIENT
Start: 2024-08-30 | End: 2024-08-30 | Stop reason: HOSPADM

## 2024-08-30 RX ORDER — AMOXICILLIN 250 MG
1-2 CAPSULE ORAL 2 TIMES DAILY PRN
Qty: 30 TABLET | Refills: 0 | Status: SHIPPED | OUTPATIENT
Start: 2024-08-30

## 2024-08-30 RX ORDER — DEXAMETHASONE SODIUM PHOSPHATE 4 MG/ML
4 INJECTION, SOLUTION INTRA-ARTICULAR; INTRALESIONAL; INTRAMUSCULAR; INTRAVENOUS; SOFT TISSUE
Status: DISCONTINUED | OUTPATIENT
Start: 2024-08-30 | End: 2024-08-30 | Stop reason: HOSPADM

## 2024-08-30 RX ORDER — CEFAZOLIN SODIUM/WATER 2 G/20 ML
2 SYRINGE (ML) INTRAVENOUS SEE ADMIN INSTRUCTIONS
Status: DISCONTINUED | OUTPATIENT
Start: 2024-08-30 | End: 2024-08-30

## 2024-08-30 RX ORDER — FENTANYL CITRATE 50 UG/ML
INJECTION, SOLUTION INTRAMUSCULAR; INTRAVENOUS PRN
Status: DISCONTINUED | OUTPATIENT
Start: 2024-08-30 | End: 2024-08-30

## 2024-08-30 RX ORDER — CEFAZOLIN SODIUM/WATER 2 G/20 ML
2 SYRINGE (ML) INTRAVENOUS
Status: COMPLETED | OUTPATIENT
Start: 2024-08-30 | End: 2024-08-30

## 2024-08-30 RX ORDER — IBUPROFEN 800 MG/1
800 TABLET, FILM COATED ORAL ONCE
Status: DISCONTINUED | OUTPATIENT
Start: 2024-08-30 | End: 2024-08-30 | Stop reason: HOSPADM

## 2024-08-30 RX ORDER — GLYCOPYRROLATE 0.2 MG/ML
INJECTION, SOLUTION INTRAMUSCULAR; INTRAVENOUS PRN
Status: DISCONTINUED | OUTPATIENT
Start: 2024-08-30 | End: 2024-08-30

## 2024-08-30 RX ORDER — FENTANYL CITRATE 50 UG/ML
50 INJECTION, SOLUTION INTRAMUSCULAR; INTRAVENOUS EVERY 5 MIN PRN
Status: DISCONTINUED | OUTPATIENT
Start: 2024-08-30 | End: 2024-08-30 | Stop reason: HOSPADM

## 2024-08-30 RX ORDER — METRONIDAZOLE 500 MG/100ML
500 INJECTION, SOLUTION INTRAVENOUS
Status: COMPLETED | OUTPATIENT
Start: 2024-08-30 | End: 2024-08-30

## 2024-08-30 RX ORDER — IBUPROFEN 800 MG/1
800 TABLET, FILM COATED ORAL EVERY 6 HOURS PRN
Qty: 30 TABLET | Refills: 0 | Status: SHIPPED | OUTPATIENT
Start: 2024-08-30

## 2024-08-30 RX ORDER — OXYCODONE HYDROCHLORIDE 5 MG/1
5 TABLET ORAL
Status: COMPLETED | OUTPATIENT
Start: 2024-08-30 | End: 2024-08-30

## 2024-08-30 RX ORDER — ACETAMINOPHEN 325 MG/1
975 TABLET ORAL EVERY 6 HOURS PRN
Qty: 50 TABLET | Refills: 0 | Status: SHIPPED | OUTPATIENT
Start: 2024-08-30

## 2024-08-30 RX ORDER — OXYCODONE HYDROCHLORIDE 5 MG/1
10 TABLET ORAL
Status: DISCONTINUED | OUTPATIENT
Start: 2024-08-30 | End: 2024-08-30 | Stop reason: HOSPADM

## 2024-08-30 RX ORDER — ACETAMINOPHEN 325 MG/1
975 TABLET ORAL ONCE
Status: DISCONTINUED | OUTPATIENT
Start: 2024-08-30 | End: 2024-08-30 | Stop reason: HOSPADM

## 2024-08-30 RX ORDER — PROPOFOL 10 MG/ML
INJECTION, EMULSION INTRAVENOUS PRN
Status: DISCONTINUED | OUTPATIENT
Start: 2024-08-30 | End: 2024-08-30

## 2024-08-30 RX ADMIN — ACETAMINOPHEN 975 MG: 325 TABLET, FILM COATED ORAL at 11:06

## 2024-08-30 RX ADMIN — GLYCOPYRROLATE 0.2 MG: 0.2 INJECTION, SOLUTION INTRAMUSCULAR; INTRAVENOUS at 12:01

## 2024-08-30 RX ADMIN — DEXAMETHASONE SODIUM PHOSPHATE 8 MG: 4 INJECTION, SOLUTION INTRA-ARTICULAR; INTRALESIONAL; INTRAMUSCULAR; INTRAVENOUS; SOFT TISSUE at 12:01

## 2024-08-30 RX ADMIN — FENTANYL CITRATE 25 MCG: 50 INJECTION, SOLUTION INTRAMUSCULAR; INTRAVENOUS at 15:21

## 2024-08-30 RX ADMIN — SODIUM CHLORIDE, POTASSIUM CHLORIDE, SODIUM LACTATE AND CALCIUM CHLORIDE: 600; 310; 30; 20 INJECTION, SOLUTION INTRAVENOUS at 13:56

## 2024-08-30 RX ADMIN — OXYCODONE HYDROCHLORIDE 5 MG: 5 TABLET ORAL at 15:43

## 2024-08-30 RX ADMIN — METRONIDAZOLE 500 MG: 500 INJECTION, SOLUTION INTRAVENOUS at 11:04

## 2024-08-30 RX ADMIN — Medication 2 G: at 11:55

## 2024-08-30 RX ADMIN — ROCURONIUM BROMIDE 50 MG: 50 INJECTION, SOLUTION INTRAVENOUS at 12:01

## 2024-08-30 RX ADMIN — HYDROMORPHONE HYDROCHLORIDE 1 MG: 1 INJECTION, SOLUTION INTRAMUSCULAR; INTRAVENOUS; SUBCUTANEOUS at 12:25

## 2024-08-30 RX ADMIN — MIDAZOLAM 2 MG: 1 INJECTION INTRAMUSCULAR; INTRAVENOUS at 11:55

## 2024-08-30 RX ADMIN — SUGAMMADEX 200 MG: 100 INJECTION, SOLUTION INTRAVENOUS at 14:01

## 2024-08-30 RX ADMIN — LIDOCAINE HYDROCHLORIDE 30 MG: 20 INJECTION, SOLUTION INFILTRATION; PERINEURAL at 12:01

## 2024-08-30 RX ADMIN — FENTANYL CITRATE 25 MCG: 50 INJECTION, SOLUTION INTRAMUSCULAR; INTRAVENOUS at 15:34

## 2024-08-30 RX ADMIN — SODIUM CHLORIDE, POTASSIUM CHLORIDE, SODIUM LACTATE AND CALCIUM CHLORIDE: 600; 310; 30; 20 INJECTION, SOLUTION INTRAVENOUS at 11:53

## 2024-08-30 RX ADMIN — FENTANYL CITRATE 25 MCG: 50 INJECTION, SOLUTION INTRAMUSCULAR; INTRAVENOUS at 15:11

## 2024-08-30 RX ADMIN — PROPOFOL 200 MG: 10 INJECTION, EMULSION INTRAVENOUS at 12:01

## 2024-08-30 RX ADMIN — PROPOFOL 75 MCG/KG/MIN: 10 INJECTION, EMULSION INTRAVENOUS at 12:20

## 2024-08-30 RX ADMIN — ONDANSETRON 4 MG: 2 INJECTION INTRAMUSCULAR; INTRAVENOUS at 13:56

## 2024-08-30 RX ADMIN — FENTANYL CITRATE 100 MCG: 50 INJECTION INTRAMUSCULAR; INTRAVENOUS at 12:01

## 2024-08-30 RX ADMIN — KETOROLAC TROMETHAMINE 30 MG: 30 INJECTION, SOLUTION INTRAMUSCULAR at 14:13

## 2024-08-30 ASSESSMENT — ACTIVITIES OF DAILY LIVING (ADL)
ADLS_ACUITY_SCORE: 31

## 2024-08-30 NOTE — ANESTHESIA POSTPROCEDURE EVALUATION
Patient: Alize Barrett    Procedure: Procedure(s):  VAGINAL HYSTERECTOMY, BILATERAL SALPINGECTOMY       Anesthesia Type:  General    Note:  Disposition: Outpatient   Postop Pain Control: Uneventful            Sign Out: Well controlled pain   PONV: No   Neuro/Psych: Uneventful            Sign Out: Acceptable/Baseline neuro status   Airway/Respiratory: Uneventful            Sign Out: Acceptable/Baseline resp. status   CV/Hemodynamics: Uneventful            Sign Out: Acceptable CV status; No obvious hypovolemia; No obvious fluid overload   Other NRE: NONE   DID A NON-ROUTINE EVENT OCCUR? No           Last vitals:  Vitals Value Taken Time   /57 08/30/24 1445   Temp 96.4  F (35.8  C) 08/30/24 1420   Pulse 75 08/30/24 1450   Resp 14 08/30/24 1450   SpO2 100 % 08/30/24 1450   Vitals shown include unfiled device data.    Electronically Signed By: Karson Zamorano MD  August 30, 2024  2:51 PM

## 2024-08-30 NOTE — INTERVAL H&P NOTE
"I have reviewed the surgical (or preoperative) H&P that is linked to this encounter, and examined the patient. There are no significant changes    Clinical Conditions Present on Arrival:  Clinically Significant Risk Factors Present on Admission                      # Obesity: Estimated body mass index is 33.91 kg/m  as calculated from the following:    Height as of this encounter: 1.727 m (5' 8\").    Weight as of this encounter: 101.2 kg (223 lb).       "

## 2024-08-30 NOTE — ANESTHESIA PREPROCEDURE EVALUATION
Anesthesia Pre-Procedure Evaluation    Patient: Alize Barrett   MRN: [id number] : 1980        Procedure : Procedure(s):  VAGINAL HYSTERECTOMY,  POSSIBLE SALPINGECTOMY BILATERAL          Past Medical History:   Diagnosis Date    Allergy, unspecified not elsewhere classified     seasonal    Asthma       Past Surgical History:   Procedure Laterality Date    LAPAROSCOPIC TUBAL LIGATION Bilateral     retinal surgery - had detached retina        Allergies   Allergen Reactions    Seasonal Allergies       Social History     Tobacco Use    Smoking status: Some Days     Current packs/day: 0.00     Average packs/day: 0.5 packs/day for 17.0 years (8.5 ttl pk-yrs)     Types: Cigarettes     Start date: 3/19/1995     Last attempt to quit: 3/19/2012     Years since quittin.4    Smokeless tobacco: Never   Substance Use Topics    Alcohol use: Not Currently     Comment: no alchol in 4 months      Wt Readings from Last 1 Encounters:   24 101.2 kg (223 lb)        Anesthesia Evaluation   Pt has had prior anesthetic. Type: General.    No history of anesthetic complications       ROS/MED HX  ENT/Pulmonary:     (+)                      asthma                  Neurologic:  - neg neurologic ROS     Cardiovascular:  - neg cardiovascular ROS     METS/Exercise Tolerance:     Hematologic:     (+)      anemia,          Musculoskeletal:  - neg musculoskeletal ROS     GI/Hepatic:  - neg GI/hepatic ROS     Renal/Genitourinary:  - neg Renal ROS     Endo:     (+)               Obesity,       Psychiatric/Substance Use:  - neg psychiatric ROS     Infectious Disease:  - neg infectious disease ROS     Malignancy:       Other:            Physical Exam    Airway        Mallampati: II   TM distance: > 3 FB   Neck ROM: full   Mouth opening: > 3 cm    Respiratory Devices and Support         Dental       (+) Minor Abnormalities - some fillings, tiny chips      Cardiovascular   cardiovascular exam normal          Pulmonary    "pulmonary exam normal                OUTSIDE LABS:  CBC:   Lab Results   Component Value Date    WBC 8.2 07/08/2020    WBC 5.1 09/27/2019    HGB 11.5 (L) 07/08/2020    HGB 12.2 09/27/2019    HCT 36.2 07/08/2020    HCT 37.1 09/27/2019     07/08/2020     09/27/2019     BMP:   Lab Results   Component Value Date     07/08/2020     09/27/2019    POTASSIUM 4.1 07/08/2020    POTASSIUM 3.5 09/27/2019    CHLORIDE 103 07/08/2020    CHLORIDE 102 09/27/2019    CO2 26 07/08/2020    CO2 23 09/27/2019    BUN 16 07/08/2020    BUN 14 09/27/2019    CR 0.86 07/08/2020    CR 0.77 09/27/2019     (H) 07/08/2020    GLC 96 09/27/2019     COAGS: No results found for: \"PTT\", \"INR\", \"FIBR\"  POC: No results found for: \"BGM\", \"HCG\", \"HCGS\"  HEPATIC:   Lab Results   Component Value Date    ALBUMIN 3.5 07/08/2020    PROTTOTAL 7.3 07/08/2020    ALT 35 07/08/2020    AST 13 07/08/2020     (H) 03/19/2019    ALKPHOS 37 (L) 07/08/2020    BILITOTAL 0.3 07/08/2020     OTHER:   Lab Results   Component Value Date    EDGARDO 8.6 07/08/2020    TSH 3.79 07/08/2020    T4 1.02 03/19/2019       Anesthesia Plan    ASA Status:  2       Anesthesia Type: General.     - Airway: ETT   Induction: Intravenous.   Maintenance: Balanced.        Consents    Anesthesia Plan(s) and associated risks, benefits, and realistic alternatives discussed. Questions answered and patient/representative(s) expressed understanding.     - Discussed:     - Discussed with:  Patient      - Extended Intubation/Ventilatory Support Discussed: No.      - Patient is DNR/DNI Status: No     Use of blood products discussed: No .     Postoperative Care    Pain management: IV analgesics, Oral pain medications, Multi-modal analgesia.   PONV prophylaxis: Ondansetron (or other 5HT-3), Dexamethasone or Solumedrol     Comments:               Karson Zamorano MD    I have reviewed the pertinent notes and labs in the chart from the past 30 days and (re)examined the " "patient.  Any updates or changes from those notes are reflected in this note.              # Obesity: Estimated body mass index is 33.91 kg/m  as calculated from the following:    Height as of this encounter: 1.727 m (5' 8\").    Weight as of this encounter: 101.2 kg (223 lb).      "

## 2024-08-30 NOTE — ANESTHESIA PROCEDURE NOTES
Airway       Patient location during procedure: OR       Procedure Start/Stop Times: 8/30/2024 12:03 PM  Staff -        CRNA: Valencia Hamilton APRN CRNA       Performed By: CRNAIndications and Patient Condition       Indications for airway management: david-procedural       Induction type:intravenous       Mask difficulty assessment: 1 - vent by mask    Final Airway Details       Final airway type: endotracheal airway       Successful airway: ETT - single  Endotracheal Airway Details        ETT size (mm): 7.0       Cuffed: yes       Cuff volume (mL): 8       Successful intubation technique: video laryngoscopy       VL Blade Size: Glidescope 3       Grade View of Cords: 1       Position: Center       Measured from: lips       Secured at (cm): 23       Bite block used: None    Post intubation assessment        Placement verified by: capnometry, equal breath sounds and chest rise        Number of attempts at approach: 1       Number of other approaches attempted: 0       Secured with: tape       Ease of procedure: easy       Dentition: Intact and Unchanged    Medication(s) Administered   Medication Administration Time: 8/30/2024 12:03 PM

## 2024-08-30 NOTE — OP NOTE
Operative Note    Date: 2024     Patient: Alize Barrett   MRN: [id number]     Surgeon: Samantha Enriquez MD    Assistant:  N/A    Pre-Op Diagnosis:    -  with menorrhagia, anemia  - suspected adenomyosis    Post-Op Diagnosis:    - Same, s/p below stated procedure(s)    Procedures:  - Total vaginal hysterectomy    - Bilateral salpingectomy      Anesthesia: GETA   EBL: 200cc  IVF: see anesthesia notes  UOP: 300cc clear blue urine at the end of the procedure     Complications: None apparent    Specimens: Uterus, cervix and portions of bilateral fallopian tubes     Indications: menorrhagia, adenomyosis    Findings: The uterus is enlarged, mobile,  no adnexal masses.   Bilateral ovaries and fallopian tubes are visibly normal.  The uterus is grossly enlarged secondary to adenomyosis Cuff was digitally intact. Specimen weight 305g.    Operative Description: After informed consent was confirmed in the preoperative holding area, the patient was taken to the operating room where she underwent the administration of general anesthesia.   The patient was then positioned in the dorsal lithotomy position in yellow fin stirrups. Care was taken to avoid excess flexion or external rotation. The patient was then prepped and draped in a normal sterile fashion.  A Thakkar catheter was placed, and the bladder drained. A weighted speculum was placed in the vagina.  The posterior cul-de-sac was entered sharply using Gauthier scissors.  Entry was confirmed with palpation of the uterine fundus.  The long weighted speculum was placed into the peritoneal cavity.Using a scalpel, a circumferential incision was made around the cervix at the cervico-vaginal junction.  This was carried down to the deep underlying connective tissue.  Using Metzenbaum scissors, the bladder was carefully dissected away from the cervix until we were able to identify the smooth vesicouterine peritoneum and entry anteriorly into the peritoneal cavity was  confirmed with visualization of the uterine fundus and bowel loops.  A narrow susan was then placed anteriorly into the peritoneal cavity, retracting the bladder away.   Attention was then turned to the left uterosacral ligament.  The Ligasure was used for the remainder of the case, unless otherwise specified. The left, then right uterosacrals were clamped, sealed, and ligated bilaterally.  With transection of the uterosacral ligaments, more uterine descenus was achieved. Then, in a sequential fashion, the cardinal ligaments, uterine arteries, and uteroovarian ligaments were clamped, sealed, and transected. The cervix and uterus were delivered from the vagina.  The uterine tissue was sent to Pathology for analysis.   All pedicles were carefully inspected and found to be hemostatic. The tubes and ovaries were visualized and palpated.  Both were normal to visualization and palpation.  Bilateral salpingectomy was then performed by transection the portions of the tube which were visible, then sealed and cut.The uterosacral ligament was identified on the patient's right and left and incorporated into the vaginal cuff apices. The vagina was then reapproximated using 0-Vicryl in a locked and running fashion.   Hemostasis was confirmed. Inspection of the vagina at this time did not reveal any bleeding and the cuff with intact with digital exam.     All sponge, lap and needle counts were correct x 2. Ancef was given prior to skin incision. The patient tolerated the procedure well and was taken to the PACU in stable condition.     Samantha Enriquez MD on 8/30/2024 at 2:29 PM

## 2024-08-30 NOTE — DISCHARGE INSTRUCTIONS
Maximum acetaminophen (Tylenol) dose from all sources should not exceed 4 grams (4000 mg) per day. Your last dose 975 mg was at 11:06 am. Your next dose is 5:06 pm or after.     You received Toradol, an IV form of Ibuprofen (Motrin) at 2:13 pm.  Do not take any Ibuprofen products until 8:13 pm or after.    Dr. Enriquez's Office #[phone]

## 2024-09-03 LAB
PATH REPORT.COMMENTS IMP SPEC: NORMAL
PATH REPORT.COMMENTS IMP SPEC: NORMAL
PATH REPORT.FINAL DX SPEC: NORMAL
PATH REPORT.GROSS SPEC: NORMAL
PATH REPORT.MICROSCOPIC SPEC OTHER STN: NORMAL
PATH REPORT.RELEVANT HX SPEC: NORMAL
PHOTO IMAGE: NORMAL

## 2025-04-19 ENCOUNTER — HEALTH MAINTENANCE LETTER (OUTPATIENT)
Age: 45
End: 2025-04-19

## (undated) DEVICE — LINEN HALF SHEET 5512

## (undated) DEVICE — CATH TRAY FOLEY SURESTEP 16FR DRAIN BAG STATOCK A899916

## (undated) DEVICE — PREP SCRUB SOL EXIDINE 4% CHG 4OZ 29002-404

## (undated) DEVICE — SUCTION CANISTER MEDIVAC LINER 3000ML W/LID 65651-530

## (undated) DEVICE — LINEN FULL SHEET 5511

## (undated) DEVICE — GLOVE BIOGEL PI MICRO SZ 6.0 48560

## (undated) DEVICE — BAG CLEAR TRASH 1.3M 39X33" P4040C

## (undated) DEVICE — LINEN ORTHO ACL PACK 5447

## (undated) DEVICE — GLOVE BIOGEL PI SZ 6.5 40865

## (undated) DEVICE — PAD CHUX UNDERPAD 30X36" P3036C

## (undated) DEVICE — GOWN XLG DISP 9545

## (undated) DEVICE — GLOVE BIOGEL PI SZ 7.0 40870

## (undated) DEVICE — PACK MAJOR LITHOTOMY RIDGES

## (undated) DEVICE — ESU GROUND PAD ADULT W/CORD E7507

## (undated) DEVICE — ESU LIGASURE IMPACT OPEN SEALER/DVDR CVD LG JAW LF4418

## (undated) DEVICE — SU VICRYL 0 CT-1 27" J340H

## (undated) RX ORDER — ONDANSETRON 2 MG/ML
INJECTION INTRAMUSCULAR; INTRAVENOUS
Status: DISPENSED
Start: 2024-08-30

## (undated) RX ORDER — FENTANYL CITRATE 50 UG/ML
INJECTION, SOLUTION INTRAMUSCULAR; INTRAVENOUS
Status: DISPENSED
Start: 2024-08-30

## (undated) RX ORDER — DEXAMETHASONE SODIUM PHOSPHATE 4 MG/ML
INJECTION, SOLUTION INTRA-ARTICULAR; INTRALESIONAL; INTRAMUSCULAR; INTRAVENOUS; SOFT TISSUE
Status: DISPENSED
Start: 2024-08-30

## (undated) RX ORDER — PROPOFOL 10 MG/ML
INJECTION, EMULSION INTRAVENOUS
Status: DISPENSED
Start: 2024-08-30

## (undated) RX ORDER — METRONIDAZOLE 500 MG/100ML
INJECTION, SOLUTION INTRAVENOUS
Status: DISPENSED
Start: 2024-08-30

## (undated) RX ORDER — LIDOCAINE HYDROCHLORIDE 10 MG/ML
INJECTION, SOLUTION EPIDURAL; INFILTRATION; INTRACAUDAL; PERINEURAL
Status: DISPENSED
Start: 2024-08-30

## (undated) RX ORDER — GLYCOPYRROLATE 0.2 MG/ML
INJECTION, SOLUTION INTRAMUSCULAR; INTRAVENOUS
Status: DISPENSED
Start: 2024-08-30

## (undated) RX ORDER — OXYCODONE HYDROCHLORIDE 5 MG/1
TABLET ORAL
Status: DISPENSED
Start: 2024-08-30

## (undated) RX ORDER — CEFAZOLIN SODIUM/WATER 2 G/20 ML
SYRINGE (ML) INTRAVENOUS
Status: DISPENSED
Start: 2024-08-30

## (undated) RX ORDER — ACETAMINOPHEN 325 MG/1
TABLET ORAL
Status: DISPENSED
Start: 2024-08-30